# Patient Record
Sex: FEMALE | Race: WHITE | NOT HISPANIC OR LATINO | Employment: FULL TIME | URBAN - NONMETROPOLITAN AREA
[De-identification: names, ages, dates, MRNs, and addresses within clinical notes are randomized per-mention and may not be internally consistent; named-entity substitution may affect disease eponyms.]

---

## 2017-05-10 ENCOUNTER — OFFICE VISIT (OUTPATIENT)
Dept: SURGERY | Facility: CLINIC | Age: 67
End: 2017-05-10

## 2017-05-10 VITALS
DIASTOLIC BLOOD PRESSURE: 74 MMHG | HEART RATE: 72 BPM | SYSTOLIC BLOOD PRESSURE: 138 MMHG | RESPIRATION RATE: 16 BRPM | BODY MASS INDEX: 22.45 KG/M2 | WEIGHT: 122 LBS | HEIGHT: 62 IN

## 2017-05-10 DIAGNOSIS — L72.0 EPIDERMOID CYST OF SKIN: Primary | ICD-10-CM

## 2017-05-10 PROCEDURE — 11402 EXC TR-EXT B9+MARG 1.1-2 CM: CPT | Performed by: SURGERY

## 2017-05-10 RX ORDER — ATORVASTATIN CALCIUM 40 MG/1
TABLET, FILM COATED ORAL
COMMUNITY
Start: 2017-05-05 | End: 2019-05-15

## 2017-05-10 RX ORDER — SITAGLIPTIN AND METFORMIN HYDROCHLORIDE 1000; 50 MG/1; MG/1
TABLET, FILM COATED ORAL
COMMUNITY
Start: 2017-05-05 | End: 2019-05-15

## 2017-05-17 ENCOUNTER — OFFICE VISIT (OUTPATIENT)
Dept: SURGERY | Facility: CLINIC | Age: 67
End: 2017-05-17

## 2017-05-17 DIAGNOSIS — Z98.890 STATUS POST EXCISIONAL BIOPSY: Primary | ICD-10-CM

## 2017-05-17 PROCEDURE — 99024 POSTOP FOLLOW-UP VISIT: CPT | Performed by: SURGERY

## 2019-05-15 ENCOUNTER — OFFICE VISIT (OUTPATIENT)
Dept: SURGERY | Facility: CLINIC | Age: 69
End: 2019-05-15

## 2019-05-15 VITALS
SYSTOLIC BLOOD PRESSURE: 136 MMHG | HEART RATE: 72 BPM | WEIGHT: 118 LBS | RESPIRATION RATE: 16 BRPM | BODY MASS INDEX: 21.71 KG/M2 | DIASTOLIC BLOOD PRESSURE: 78 MMHG | HEIGHT: 62 IN

## 2019-05-15 DIAGNOSIS — L98.8 DYSPLASTIC SKIN: Primary | ICD-10-CM

## 2019-05-15 PROCEDURE — 11403 EXC TR-EXT B9+MARG 2.1-3CM: CPT | Performed by: SURGERY

## 2019-05-15 RX ORDER — FEXOFENADINE HCL 180 MG/1
180 TABLET ORAL DAILY
COMMUNITY
End: 2020-05-19

## 2019-05-15 NOTE — PROGRESS NOTES
Cristel Ojeda 69 y.o. female presents as a self ref for eval pink skin lesion LEFT chest.  Pt denies pain, itching, or burning.  Pt has applied husb barrier cream with bandaid to area x few days.   Chief Complaint   Patient presents with   • Skin Lesion     LEFT chest             HPI   Above noted and agree.  Cristel has an area of dryness that is irritated and has grown in size.  She would like it removed.      Review of Systems          Current Outpatient Medications:   •  fexofenadine (ALLEGRA) 180 MG tablet, Take 180 mg by mouth Daily., Disp: , Rfl:   •  fluticasone (VERAMYST) 27.5 MCG/SPRAY nasal spray, 2 sprays into the nostril(s) as directed by provider Daily., Disp: , Rfl:   •  metFORMIN (GLUCOPHAGE) 1000 MG tablet, Take 1,000 mg by mouth 2 (Two) Times a Day With Meals., Disp: , Rfl:         No Known Allergies        Past Medical History:   Diagnosis Date   • Diabetes (CMS/HCC)    • Hyperlipidemia            Past Surgical History:   Procedure Laterality Date   • KNEE SURGERY     • TUBAL ABDOMINAL LIGATION             Social History     Tobacco Use   • Smoking status: Former Smoker   Substance Use Topics   • Alcohol use: No   • Drug use: Not on file             There is no immunization history on file for this patient.        Physical Exam     I discussed with the patient the benefits and risks of excising this lesion.   Risks not limited to but including bleeding, infection, having to excise more if the lesion turns out to be cancer.  The patient appeared to understand and signed informed consent.  The area was prepped and draped in the usual sterile fashion.  Local was injected into the area to be excised.  The lesion was then excised.  Hemostasis was perfected.  The wound was then closed using simple sutures.  Sterile dressings were applied.  The patient tolerated the procedure well without complication.  Wound care instructions were then given to the patient.  The lesion measured 3 cm x 2 cm x 0.5 cm and was  "located on her chest    Debilities/Disabilities Identified: None    Emotional Behavior: Appropriate      /78   Pulse 72   Resp 16   Ht 157.5 cm (62\")   Wt 53.5 kg (118 lb)   BMI 21.58 kg/m²         Cristel was seen today for skin lesion.    Diagnoses and all orders for this visit:    Dysplastic skin    We will remove the sutures next week.    Thank you for allowing me to participate in the care of this interesting patient.        "

## 2019-05-23 ENCOUNTER — DOCUMENTATION (OUTPATIENT)
Dept: SURGERY | Facility: CLINIC | Age: 69
End: 2019-05-23

## 2019-05-23 NOTE — PROGRESS NOTES
Pt presented for suture removal chest.  Incision appears to be healed and wound edges well approximated.  4 sutures removed.  Pt instructed on care and enc to call PRN.

## 2023-10-12 ENCOUNTER — LAB (OUTPATIENT)
Dept: LAB | Facility: HOSPITAL | Age: 73
End: 2023-10-12
Payer: COMMERCIAL

## 2023-10-12 ENCOUNTER — OFFICE VISIT (OUTPATIENT)
Dept: FAMILY MEDICINE CLINIC | Facility: CLINIC | Age: 73
End: 2023-10-12
Payer: COMMERCIAL

## 2023-10-12 VITALS
HEIGHT: 62 IN | RESPIRATION RATE: 16 BRPM | WEIGHT: 98.4 LBS | DIASTOLIC BLOOD PRESSURE: 72 MMHG | HEART RATE: 89 BPM | OXYGEN SATURATION: 98 % | BODY MASS INDEX: 18.11 KG/M2 | SYSTOLIC BLOOD PRESSURE: 138 MMHG

## 2023-10-12 DIAGNOSIS — Z12.31 ENCOUNTER FOR SCREENING MAMMOGRAM FOR BREAST CANCER: ICD-10-CM

## 2023-10-12 DIAGNOSIS — E11.65 TYPE 2 DIABETES MELLITUS WITH HYPERGLYCEMIA, WITHOUT LONG-TERM CURRENT USE OF INSULIN: Primary | ICD-10-CM

## 2023-10-12 LAB
ALBUMIN SERPL-MCNC: 4.6 G/DL (ref 3.5–5.2)
ALBUMIN/GLOB SERPL: 1.5 G/DL
ALP SERPL-CCNC: 64 U/L (ref 39–117)
ALT SERPL W P-5'-P-CCNC: 18 U/L (ref 1–33)
ANION GAP SERPL CALCULATED.3IONS-SCNC: 12 MMOL/L (ref 5–15)
AST SERPL-CCNC: 41 U/L (ref 1–32)
BILIRUB SERPL-MCNC: 0.3 MG/DL (ref 0–1.2)
BUN SERPL-MCNC: 17 MG/DL (ref 8–23)
BUN/CREAT SERPL: 23.3 (ref 7–25)
CALCIUM SPEC-SCNC: 10.3 MG/DL (ref 8.6–10.5)
CHLORIDE SERPL-SCNC: 102 MMOL/L (ref 98–107)
CHOLEST SERPL-MCNC: 131 MG/DL (ref 0–200)
CO2 SERPL-SCNC: 28 MMOL/L (ref 22–29)
CREAT SERPL-MCNC: 0.73 MG/DL (ref 0.57–1)
EGFRCR SERPLBLD CKD-EPI 2021: 87 ML/MIN/1.73
GLOBULIN UR ELPH-MCNC: 3.1 GM/DL
GLUCOSE SERPL-MCNC: 101 MG/DL (ref 65–99)
HBA1C MFR BLD: 7.7 % (ref 4.8–5.6)
HDLC SERPL-MCNC: 48 MG/DL (ref 40–60)
LDLC SERPL CALC-MCNC: 63 MG/DL (ref 0–100)
LDLC/HDLC SERPL: 1.26 {RATIO}
POTASSIUM SERPL-SCNC: 3.4 MMOL/L (ref 3.5–5.2)
PROT SERPL-MCNC: 7.7 G/DL (ref 6–8.5)
SODIUM SERPL-SCNC: 142 MMOL/L (ref 136–145)
TRIGL SERPL-MCNC: 113 MG/DL (ref 0–150)
VLDLC SERPL-MCNC: 20 MG/DL (ref 5–40)

## 2023-10-12 PROCEDURE — 80061 LIPID PANEL: CPT | Performed by: STUDENT IN AN ORGANIZED HEALTH CARE EDUCATION/TRAINING PROGRAM

## 2023-10-12 PROCEDURE — 80053 COMPREHEN METABOLIC PANEL: CPT | Performed by: STUDENT IN AN ORGANIZED HEALTH CARE EDUCATION/TRAINING PROGRAM

## 2023-10-12 PROCEDURE — 36415 COLL VENOUS BLD VENIPUNCTURE: CPT | Performed by: STUDENT IN AN ORGANIZED HEALTH CARE EDUCATION/TRAINING PROGRAM

## 2023-10-12 PROCEDURE — 83036 HEMOGLOBIN GLYCOSYLATED A1C: CPT | Performed by: STUDENT IN AN ORGANIZED HEALTH CARE EDUCATION/TRAINING PROGRAM

## 2023-10-12 RX ORDER — CALCIPOTRIENE 50 UG/G
CREAM TOPICAL
COMMUNITY
Start: 2023-09-21

## 2023-10-12 RX ORDER — INSULIN GLARGINE 100 [IU]/ML
30 INJECTION, SOLUTION SUBCUTANEOUS DAILY
Status: SHIPPED | COMMUNITY
Start: 2023-10-12 | End: 2023-10-26 | Stop reason: SDUPTHER

## 2023-10-12 RX ORDER — METFORMIN HYDROCHLORIDE 500 MG/1
500 TABLET, EXTENDED RELEASE ORAL
Status: SHIPPED | COMMUNITY
Start: 2023-10-12

## 2023-10-12 RX ORDER — FLUOROURACIL 50 MG/G
CREAM TOPICAL
COMMUNITY
Start: 2023-09-22

## 2023-10-12 RX ORDER — ATORVASTATIN CALCIUM 80 MG/1
80 TABLET, FILM COATED ORAL NIGHTLY
Status: SHIPPED | COMMUNITY
Start: 2023-10-12

## 2023-10-12 NOTE — PROGRESS NOTES
Gustavo Guajardo,   [facility]  [address]  [place] [address]  [phone]    Subjective      Name Cristel Ojeda MRN [id number]    1950 AGE/SEX 73 y.o. / female      Chief Complaint Chief Complaint   Patient presents with    Establish Care     New patient here to establish care/. Concerned about pre cancer spots on hands, fatigue and elevated heart rate          Visit History for  10/12/2023    History of Present Illness  Cristel Ojeda is a 73 y.o. female who presented today for Establish Care (New patient here to establish care/. Concerned about pre cancer spots on hands, fatigue and elevated heart rate )    The patient is accompanied by her . Her last primary care doctor was Dr. Liliana Mclaughlin who prescribed her medications. She states that she was prescribed a significant amount of medications and is unsure if she should be on all of those medications or not. She has not been taking the majority of her medications because [facility] is not writing the necessary information on the labels and doesn't know what they are for so she doesn't take them.     The patient is on 4 different diabetes medications according to her medication list. She takes 30 units of insulin in the morning. She used to weigh 120 pounds and now only weighs 95 pounds over the last year. She states that she does not eat plenty of foods. She has not been taking her blood glucose levels. She notes that she does not know what her medications are for.    The patient last had her blood tests done probably 1 month ago (2023). The adult male reports that the patient has been confused most of the time, has no energy, is not willing to stand up, only eats 2 bites of foods, and has lost a significant amount of weight.  that the patient has been having pain all over her body. The adult male also mentions that the patient will only drink 2 sips of fluids and will refuse to drink  all of it. She notes that she likes strawberry cheesecake. She notes that she ate some peanut butter and carrot cake today, 10/12/2023. The adult male mentions that the patient's blood glucose has been elevated at night and has not been sleeping very well. She mentions that she still works and has been waking up around 4:00 AM.    Her last mammogram was probably 10 or 12 years ago.     The patient reports that she had her back looked at as she was having back pain. She was informed that she probably has arthritis after she had a magnetic resonance imaging done. She also relates her back pain to her current age of 73. The adult male notes that the patient started having back pain 6 months ag every night and has been noticed to be limping. She will have pain whenever she moves. The patient notes that her arthritis has not been resolving.    The patient reports that she is no longer able to open objects with her hands. She notes that she is more worried on the spots in her hands. She has precancerous cells. She notes that she has been scratching the spots and it made the spots more reddish in color.    The patient's blood pressure is mildly elevated today, 10/12/2023. She has not been taking her blood pressure medication, as she does not know which one it is. She took one of her medications, which caused her to be lightheaded all day.      Medications and Allergies   Current Outpatient Medications   Medication Instructions    atorvastatin (LIPITOR) 80 mg, Oral, Nightly, For High Cholesterol    calcipotriene (DOVONEX) 0.005 % cream     fluorouracil (EFUDEX) 5 % cream     glucose blood test strip 1 each, Other, 4 Times Daily Before Meals & Nightly, Use as instructed    glucose monitor monitoring kit 1 each, Does not apply, 4 Times Daily Before Meals & Nightly    Lancets (accu-chek safe-t pro) lancets 1 each, Other, 4 Times Daily Before Meals & Nightly    Lantus SoloStar 32 Units, Subcutaneous, Daily    metFORMIN ER  "(GLUCOPHAGE-XR) 500 mg, Daily With Breakfast, For diabetes     No Known Allergies   I have reviewed the above medications and allergies     Objective:      Vitals Vitals:    10/12/23 1135   BP: 138/72   BP Location: Right arm   Patient Position: Sitting   Cuff Size: Adult   Pulse: 89   Resp: 16   SpO2: 98%   Weight: 44.6 kg (98 lb 6.4 oz)   Height: 157.5 cm (62\")     Body mass index is 18 kg/m².    Physical Exam  Vitals reviewed.   Constitutional:       General: She is not in acute distress.     Appearance: She is not ill-appearing.   Cardiovascular:      Rate and Rhythm: Normal rate and regular rhythm.   Pulmonary:      Effort: Pulmonary effort is normal.      Breath sounds: Normal breath sounds.   Neurological:      Mental Status: She is alert.   Psychiatric:         Mood and Affect: Mood normal.         Behavior: Behavior normal.         Thought Content: Thought content normal.         Judgment: Judgment normal.            Assessment/Plan      Issues Addressed/ Plan  1. Type 2 diabetes mellitus with hyperglycemia, without long-term current use of insulin  - Question patient compliance with medication.  Going to need labs completed today to make sure that medication is sufficient.    - Possible disordered eating.  Has lost wt and patient  stating that she does not eat much.  Need to continue to monitor BG  - Comprehensive Metabolic Panel  - Hemoglobin A1c  - Lipid Panel  - MicroAlbumin, Urine, Random - Urine, Clean Catch    2. Encounter for screening mammogram for breast cancer  - Mammo Screening Digital Tomosynthesis Bilateral With CAD     There are no Patient Instructions on file for this visit.        Follow up  recommended Return in about 2 weeks (around 10/26/2023).   - Dragon voice recognition software was utilized to complete this chart.  Every reasonable attempt was made to edit and correct the text, however some incorrect words may remain.   Gustavo Guajardo,     Transcribed from ambient dictation " for Gustavo Guajardo, DO by Desiree Bell.  10/12/23   16:32 EDT    Patient or patient representative verbalized consent to the visit recording.  I have personally performed the services described in this document as transcribed by the above individual, and it is both accurate and complete.

## 2023-10-26 ENCOUNTER — PATIENT ROUNDING (BHMG ONLY) (OUTPATIENT)
Dept: FAMILY MEDICINE CLINIC | Facility: CLINIC | Age: 73
End: 2023-10-26
Payer: COMMERCIAL

## 2023-10-26 ENCOUNTER — OFFICE VISIT (OUTPATIENT)
Dept: FAMILY MEDICINE CLINIC | Facility: CLINIC | Age: 73
End: 2023-10-26
Payer: COMMERCIAL

## 2023-10-26 VITALS
WEIGHT: 97.02 LBS | DIASTOLIC BLOOD PRESSURE: 82 MMHG | RESPIRATION RATE: 16 BRPM | BODY MASS INDEX: 17.85 KG/M2 | HEIGHT: 62 IN | OXYGEN SATURATION: 99 % | SYSTOLIC BLOOD PRESSURE: 152 MMHG | HEART RATE: 62 BPM

## 2023-10-26 DIAGNOSIS — R21 RASH: ICD-10-CM

## 2023-10-26 DIAGNOSIS — E11.65 TYPE 2 DIABETES MELLITUS WITH HYPERGLYCEMIA, WITHOUT LONG-TERM CURRENT USE OF INSULIN: Primary | ICD-10-CM

## 2023-10-26 PROCEDURE — 99214 OFFICE O/P EST MOD 30 MIN: CPT | Performed by: STUDENT IN AN ORGANIZED HEALTH CARE EDUCATION/TRAINING PROGRAM

## 2023-10-26 RX ORDER — INSULIN GLARGINE 100 [IU]/ML
32 INJECTION, SOLUTION SUBCUTANEOUS DAILY
Qty: 15 ML | Refills: 0 | Status: SHIPPED | OUTPATIENT
Start: 2023-10-26 | End: 2023-11-09

## 2023-10-26 RX ORDER — BLOOD-GLUCOSE METER
1 KIT MISCELLANEOUS
Qty: 1 EACH | Refills: 0 | Status: SHIPPED | OUTPATIENT
Start: 2023-10-26

## 2023-10-26 RX ORDER — LANCETS 23 GAUGE
1 EACH MISCELLANEOUS
Qty: 200 EACH | Refills: 12 | Status: SHIPPED | OUTPATIENT
Start: 2023-10-26

## 2023-10-26 NOTE — PROGRESS NOTES
A [employer] message has been sent to the patient for PATIENT ROUNDING with [facility]

## 2023-10-26 NOTE — PROGRESS NOTES
Gustavo Guajardo DO  [facility]  [facility]  [place] [address] [phone]    Subjective      Name Cristel Ojeda MRN [id number]    1950 AGE/SEX 73 y.o. / female      Chief Complaint Chief Complaint   Patient presents with    Diabetes     Follow up     Rash     Rash on hand still causing problems          Visit History for  10/26/2023    History of Present Illness  Cristel Ojeda is a 73 y.o. female who presented today for Diabetes (Follow up ) and Rash (Rash on hand still causing problems )  .   The patient is accompanied by an adult male.    The patient is complaining of a precancerous rash with pruritus on her left hand. She has been applying fluorouracil on the hand.5 days on and 7 days off. She called the dermatologist, and her next appointment is 2023. The patient picks at the rash causing it to be erythema and inflammation. The cream is causing her skin to peel and become rough. The dermatologist advised her not to wear gloves.    The patient has not been checking her blood glucose at home because her machine stopped working 2 or 3 weeks ago. She is not eating much because food does not taste like it used to. She is taking Lantus. The patient is severely underweight.     Occasionally, the patient will smoke a cigarette. The patient works as a .        Medications and Allergies   Current Outpatient Medications   Medication Instructions    atorvastatin (LIPITOR) 80 mg, Oral, Nightly, For High Cholesterol    calcipotriene (DOVONEX) 0.005 % cream     celecoxib (CELEBREX) 100 mg, Oral, Daily    glucose blood test strip 1 each, Other, 4 Times Daily Before Meals & Nightly, Use as instructed    glucose monitor monitoring kit 1 each, Does not apply, 4 Times Daily Before Meals & Nightly    Lancets (accu-chek safe-t pro) lancets 1 each, Other, 4 Times Daily Before Meals & Nightly    Lantus SoloStar 34 Units, Subcutaneous, Daily    metFORMIN ER  "(GLUCOPHAGE-XR) 500 mg, Daily With Breakfast, For diabetes     No Known Allergies   I have reviewed the above medications and allergies     Objective:      Vitals Vitals:    10/26/23 1140   BP: 152/82   BP Location: Right arm   Patient Position: Sitting   Cuff Size: Adult   Pulse: 62   Resp: 16   SpO2: 99%   Weight: 44 kg (97 lb 0.3 oz)   Height: 157.5 cm (62\")     Body mass index is 17.75 kg/m².    Physical Exam  Vitals reviewed.   Constitutional:       General: She is not in acute distress.     Appearance: She is not ill-appearing.   Pulmonary:      Effort: Pulmonary effort is normal.   Psychiatric:         Mood and Affect: Mood normal.         Behavior: Behavior normal.         Thought Content: Thought content normal.         Judgment: Judgment normal.            Assessment/Plan      Issues Addressed/ Plan  1. Type 2 diabetes mellitus with hyperglycemia, without long-term current use of insulin  -Going to increase Lantus 32 units.  - Going to order glucose testing machine so that patient can check at least morning fasting labs.  Diabetes is most likely poorly controlled as well as patient diet is poor.  - May need to refer to dietitian in the future as patient is currently underweight.  - glucose monitor monitoring kit; Use 1 each 4 (Four) Times a Day Before Meals & at Bedtime.  Dispense: 1 each; Refill: 0  - Lantus SoloStar 100 UNIT/ML injection pen; Inject 32 Units under the skin into the appropriate area as directed Daily for 30 days. Indications: Type 2 Diabetes  Dispense: 15 mL; Refill: 0  - glucose blood test strip; 1 each by Other route 4 (Four) Times a Day Before Meals & at Bedtime. Use as instructed  Dispense: 200 each; Refill: 1  - Lancets (accu-chek safe-t pro) lancets; 1 each by Other route 4 (Four) Times a Day Before Meals & at Bedtime.  Dispense: 200 each; Refill: 12    Rash   Left hand rash.  - Most likely secondary to use of fluorouracil.  The patient is obsessive about her hand and is generally " not worried about other medical conditions at this time.  Going to have patient's stop the cream and consult with her dermatologist.  There are open wounds between her fingers as well.  - Need to keep hands and wounds clean and use Vaseline or moisturizing ointment       There are no Patient Instructions on file for this visit.  BMI is below normal parameters (malnutrition). Recommendations: treating the underlying disease process and Information on healthy weight added to patient's after visit summary         Follow up  recommended Return in about 2 weeks (around 11/9/2023).   - Dragon voice recognition software was utilized to complete this chart.  Every reasonable attempt was made to edit and correct the text, however some incorrect words may remain.   Gustavo Guajardo DO       Transcribed from ambient dictation for Gustavo Guajardo DO by Peggy Man.  10/26/23   19:31 EDT    Patient or patient representative verbalized consent to the visit recording.  I have personally performed the services described in this document as transcribed by the above individual, and it is both accurate and complete.

## 2023-11-09 ENCOUNTER — OFFICE VISIT (OUTPATIENT)
Dept: FAMILY MEDICINE CLINIC | Facility: CLINIC | Age: 73
End: 2023-11-09
Payer: COMMERCIAL

## 2023-11-09 VITALS
SYSTOLIC BLOOD PRESSURE: 142 MMHG | RESPIRATION RATE: 20 BRPM | WEIGHT: 99 LBS | HEART RATE: 91 BPM | OXYGEN SATURATION: 99 % | DIASTOLIC BLOOD PRESSURE: 94 MMHG | HEIGHT: 62 IN | BODY MASS INDEX: 18.22 KG/M2

## 2023-11-09 DIAGNOSIS — Z13.820 SCREENING FOR OSTEOPOROSIS: ICD-10-CM

## 2023-11-09 DIAGNOSIS — G89.29 CHRONIC BILATERAL LOW BACK PAIN WITHOUT SCIATICA: ICD-10-CM

## 2023-11-09 DIAGNOSIS — Z12.11 SCREENING FOR MALIGNANT NEOPLASM OF COLON: Primary | ICD-10-CM

## 2023-11-09 DIAGNOSIS — M19.90 ARTHRITIS: ICD-10-CM

## 2023-11-09 DIAGNOSIS — E11.65 TYPE 2 DIABETES MELLITUS WITH HYPERGLYCEMIA, WITHOUT LONG-TERM CURRENT USE OF INSULIN: ICD-10-CM

## 2023-11-09 DIAGNOSIS — M54.50 CHRONIC BILATERAL LOW BACK PAIN WITHOUT SCIATICA: ICD-10-CM

## 2023-11-09 RX ORDER — INSULIN GLARGINE 100 [IU]/ML
34 INJECTION, SOLUTION SUBCUTANEOUS DAILY
Qty: 15 ML | Refills: 0 | Status: SHIPPED | OUTPATIENT
Start: 2023-11-09 | End: 2023-12-09

## 2023-11-09 RX ORDER — CELECOXIB 100 MG/1
100 CAPSULE ORAL DAILY
Qty: 30 CAPSULE | Refills: 2 | Status: SHIPPED | OUTPATIENT
Start: 2023-11-09

## 2023-11-09 NOTE — PROGRESS NOTES
Gustavo Guajardo,   [facility]  [address]  [place] [id number] [phone]    Subjective      Name Cristel Ojeda MRN [id number]    1950 AGE/SEX 73 y.o. / female      Chief Complaint Chief Complaint   Patient presents with    Diabetes     Follow up, has not been checking blood sugar as directed     Back Pain     Having a lot of lower back pain          Visit History for  2023    History of Present Illness  Cristel Ojeda is a 73 y.o. female who presented today for Diabetes (Follow up, has not been checking blood sugar as directed ) and Back Pain (Having a lot of lower back pain )    Had mild weight gain of 2 pounds. Blood glucose was 122 mg/dL this morning, 2023, before eating. Only ate a bowl of chili and some potato chips yesterday, 2023. Blood glucose was 237 mg/dL yesterday, 2023. Only consumes Ensure with a cup of coffee and eats no breakfast. Only drinks Diet Coke. Hemoglobin A1c was 7.7 percent. Doing 32 units of insulin. Has appetite today, 2023, due to not eating much food. Not a big eater and never had been. Weight dropped significantly starting in Fall last year, 2023 or 10/2023.    Spots from the hands have diminished. Has not used the cream for over 1 week now. Notes that hands are still red.    Declines to have a colonoscopy. Has an appointment for a mammogram next week, the week of 2023. Last colonoscopy was probably in  or . Agrees to do a bone density screening.    Has been having pain in the back. Magnetic resonance imaging from 4 or 5 months ago showed arthritis in the spine. Takes Tylenol every now and then for the back pain, which helps occasionally.    Sister had colon cancer. Still works all the time, starting from 5:30 AM.      Medications and Allergies   Current Outpatient Medications   Medication Instructions    atorvastatin (LIPITOR) 80 mg, Oral, Nightly, For High Cholesterol    calcipotriene  "(DOVONEX) 0.005 % cream     celecoxib (CELEBREX) 100 mg, Oral, Daily    glucose blood test strip 1 each, Other, 4 Times Daily Before Meals & Nightly, Use as instructed    glucose monitor monitoring kit 1 each, Does not apply, 4 Times Daily Before Meals & Nightly    Lancets (accu-chek safe-t pro) lancets 1 each, Other, 4 Times Daily Before Meals & Nightly    Lantus SoloStar 34 Units, Subcutaneous, Daily    metFORMIN ER (GLUCOPHAGE-XR) 500 mg, Daily With Breakfast, For diabetes     No Known Allergies   I have reviewed the above medications and allergies     Objective:      Vitals Vitals:    11/09/23 1353   BP: 142/94   BP Location: Right arm   Patient Position: Sitting   Cuff Size: Adult   Pulse: 91   Resp: 20   SpO2: 99%   Weight: 44.9 kg (99 lb)   Height: 157.5 cm (62\")     Body mass index is 18.11 kg/m².    Physical Exam  Vitals reviewed.   Constitutional:       General: She is not in acute distress.     Appearance: She is not ill-appearing.   Pulmonary:      Effort: Pulmonary effort is normal.   Psychiatric:         Mood and Affect: Mood normal.         Behavior: Behavior normal.         Thought Content: Thought content normal.         Judgment: Judgment normal.            Assessment/Plan      Issues Addressed/ Plan  1. Type 2 diabetes mellitus with hyperglycemia, without long-term current use of insulin  -Going to increase Lantus from 32 units to 34 units due to elevated fasting glucose in the mornings.  - Continue to keep track of blood glucose levels in the morning and make sure that she is not going below 80 mg/dL  - Continue drinking Ensure in order to gain weight and increase protein.  - Ideally patient should weigh around 110 pounds  - Continue to focus on macros with protein and fats.  - Lantus SoloStar 100 UNIT/ML injection pen; Inject 34 Units under the skin into the appropriate area as directed Daily for 30 days. Indications: Type 2 Diabetes  Dispense: 15 mL; Refill: 0    2. Screening for malignant " neoplasm of colon  -Patient declines colonoscopy at this time.  - Cologuard - Stool, Per Rectum; Future    3. Screening for osteoporosis  - DEXA Bone Density Axial; Future    4. Chronic bilateral low back pain without sciatica  -Discussed natural progression of low back pain.  Going to try to utilize Celebrex in order to help.  Discussed no longer using NSAIDs along with Celebrex.  - celecoxib (CeleBREX) 100 MG capsule; Take 1 capsule by mouth Daily.  Dispense: 30 capsule; Refill: 2    5. Arthritis  - celecoxib (CeleBREX) 100 MG capsule; Take 1 capsule by mouth Daily.  Dispense: 30 capsule; Refill: 2     There are no Patient Instructions on file for this visit.         Follow up  recommended Return in about 3 months (around 2/9/2024).   - Dragon voice recognition software was utilized to complete this chart.  Every reasonable attempt was made to edit and correct the text, however some incorrect words may remain.       Transcribed from ambient dictation for Gustavo Guajardo DO by Desiree Bell.  11/09/23   17:01 EST    Patient or patient representative verbalized consent to the visit recording.  I have personally performed the services described in this document as transcribed by the above individual, and it is both accurate and complete.

## 2023-11-10 ENCOUNTER — PRIOR AUTHORIZATION (OUTPATIENT)
Dept: FAMILY MEDICINE CLINIC | Facility: CLINIC | Age: 73
End: 2023-11-10
Payer: COMMERCIAL

## 2023-11-10 NOTE — TELEPHONE ENCOUNTER
PA Celecoxib 100MG capsules  Approvedtoday  PA Case: [id number], Status: Approved, Coverage Starts on: 11/10/2023 12:00:00 AM, Coverage Ends on: 11/9/2024 12:00:00 AM.

## 2023-11-15 ENCOUNTER — HOSPITAL ENCOUNTER (OUTPATIENT)
Dept: MAMMOGRAPHY | Facility: HOSPITAL | Age: 73
Discharge: HOME | End: 2023-11-15
Admitting: STUDENT IN AN ORGANIZED HEALTH CARE EDUCATION/TRAINING PROGRAM
Payer: COMMERCIAL

## 2023-11-15 PROCEDURE — 77063 BREAST TOMOSYNTHESIS BI: CPT

## 2023-11-15 PROCEDURE — 77067 SCR MAMMO BI INCL CAD: CPT

## 2023-11-16 ENCOUNTER — TELEPHONE (OUTPATIENT)
Dept: FAMILY MEDICINE CLINIC | Facility: CLINIC | Age: 73
End: 2023-11-16

## 2023-11-16 NOTE — TELEPHONE ENCOUNTER
Caller: Cristel Ojeda    Relationship: Self    Best call back number:[phone]    What is the best time to reach you: ANYTIME    Who are you requesting to speak with (clinical staff, provider,  specific staff member): CLINICAL    What was the call regarding: PATIENT WAS REQUESTING A CALLBACK TO GIVE MAMMOGRAM RESULTS.

## 2023-12-12 ENCOUNTER — TELEPHONE (OUTPATIENT)
Dept: FAMILY MEDICINE CLINIC | Facility: CLINIC | Age: 73
End: 2023-12-12
Payer: COMMERCIAL

## 2023-12-12 NOTE — TELEPHONE ENCOUNTER
Caller: Cristel Ojeda    Relationship: Self    Best call back number:     Caller requesting test results:     What test was performed: JED    When was the test performed: 2 WEEKS AGO    Where was the test performed:     Additional notes: PATIENT IS CALLING IN TO SEE IF THE DR HAS HER COLOGUARD TEST RESULTS IN YET.  SHE WANTS TO BE CALLED TO DISCUSS.

## 2023-12-27 ENCOUNTER — TELEPHONE (OUTPATIENT)
Dept: FAMILY MEDICINE CLINIC | Facility: CLINIC | Age: 73
End: 2023-12-27
Payer: COMMERCIAL

## 2023-12-27 ENCOUNTER — LAB (OUTPATIENT)
Dept: LAB | Facility: HOSPITAL | Age: 73
End: 2023-12-27
Payer: COMMERCIAL

## 2023-12-27 DIAGNOSIS — E11.65 TYPE 2 DIABETES MELLITUS WITH HYPERGLYCEMIA, WITHOUT LONG-TERM CURRENT USE OF INSULIN: Primary | ICD-10-CM

## 2023-12-27 LAB — ALBUMIN UR-MCNC: 1.3 MG/DL

## 2023-12-27 PROCEDURE — 82043 UR ALBUMIN QUANTITATIVE: CPT | Performed by: STUDENT IN AN ORGANIZED HEALTH CARE EDUCATION/TRAINING PROGRAM

## 2024-01-10 ENCOUNTER — OFFICE VISIT (OUTPATIENT)
Dept: FAMILY MEDICINE CLINIC | Facility: CLINIC | Age: 74
End: 2024-01-10
Payer: COMMERCIAL

## 2024-01-10 VITALS
DIASTOLIC BLOOD PRESSURE: 68 MMHG | HEIGHT: 62 IN | RESPIRATION RATE: 18 BRPM | SYSTOLIC BLOOD PRESSURE: 142 MMHG | HEART RATE: 47 BPM | WEIGHT: 100 LBS | OXYGEN SATURATION: 99 % | BODY MASS INDEX: 18.4 KG/M2

## 2024-01-10 DIAGNOSIS — M54.50 CHRONIC BILATERAL LOW BACK PAIN WITHOUT SCIATICA: ICD-10-CM

## 2024-01-10 DIAGNOSIS — G89.29 CHRONIC BILATERAL LOW BACK PAIN WITHOUT SCIATICA: ICD-10-CM

## 2024-01-10 DIAGNOSIS — E78.2 MIXED HYPERLIPIDEMIA: ICD-10-CM

## 2024-01-10 DIAGNOSIS — E55.9 VITAMIN D DEFICIENCY: ICD-10-CM

## 2024-01-10 DIAGNOSIS — R53.83 FATIGUE, UNSPECIFIED TYPE: Primary | ICD-10-CM

## 2024-01-10 DIAGNOSIS — E11.65 TYPE 2 DIABETES MELLITUS WITH HYPERGLYCEMIA, WITHOUT LONG-TERM CURRENT USE OF INSULIN: ICD-10-CM

## 2024-01-10 PROCEDURE — 99214 OFFICE O/P EST MOD 30 MIN: CPT | Performed by: STUDENT IN AN ORGANIZED HEALTH CARE EDUCATION/TRAINING PROGRAM

## 2024-01-10 RX ORDER — CYCLOBENZAPRINE HCL 10 MG
10 TABLET ORAL 3 TIMES DAILY PRN
Qty: 21 TABLET | Refills: 0 | Status: SHIPPED | OUTPATIENT
Start: 2024-01-10

## 2024-01-10 NOTE — PROGRESS NOTES
Venipuncture Blood Specimen Collection  Venipuncture performed in left niesha by Kami Staples MA with good hemostasis. Patient tolerated the procedure well without complications.   01/10/24   Kami Staples MA

## 2024-01-10 NOTE — PROGRESS NOTES
Gustavo Guajardo,   [facility] PRIMARY CARE  [address]  [place] [id number] [phone]    Subjective      Name Cristel Oejda MRN [id number]    1950 AGE/SEX 73 y.o. / female      Chief Complaint Chief Complaint   Patient presents with    Diabetes     Follow up     Leg Pain     Right leg pain, patient states she sits in a chair at work and moving back and forth for her job has caused pain in her leg and lower back          Visit History for  01/10/2024    History of Present Illness  Cristel Ojeda is a 73 y.o. female who presented today for Diabetes (Follow up ) and Leg Pain (Right leg pain, patient states she sits in a chair at work and moving back and forth for her job has caused pain in her leg and lower back )  She is accompanied by an adult male.    Complaints of back pain for the last couple of weeks. Made an appointment with urgent care 2 or 3 weeks ago and received an injection, which was effective in relieving the pain. Currently is not taking any prescribed or over the counter medications. Most days are spent in an office chair. Today, minor leg pain is present as well.    Blood glucose level was 158 mg/dL this morning. Wakes up at 4 AM and makes breakfast and is at work by 5 AM. Drinks one diet Coke a day and is taking 34 units of insulin.    Did not do bone density scan and no longer wants to have the scan done. Hands are feeling better than last visit.    Her sister has colon cancer.        Medications and Allergies   Current Outpatient Medications   Medication Instructions    atorvastatin (LIPITOR) 80 mg, Oral, Nightly, For High Cholesterol    celecoxib (CELEBREX) 100 mg, Oral, Daily    cyclobenzaprine (FLEXERIL) 10 mg, Oral, 3 Times Daily PRN    glucose blood test strip 1 each, Other, 4 Times Daily Before Meals & Nightly, Use as instructed    glucose monitor monitoring kit 1 each, Does not apply, 4 Times Daily Before Meals & Nightly    Lancets (accu-chek safe-t pro)  "lancets 1 each, Other, 4 Times Daily Before Meals & Nightly    Lantus SoloStar 34 Units, Subcutaneous, Daily    metFORMIN ER (GLUCOPHAGE-XR) 500 mg, Daily With Breakfast, For diabetes     No Known Allergies   I have reviewed the above medications and allergies     Objective:      Vitals Vitals:    01/10/24 0840   BP: 142/68   BP Location: Right arm   Patient Position: Sitting   Cuff Size: Adult   Pulse: (!) 47   Resp: 18   SpO2: 99%   Weight: 45.4 kg (100 lb)   Height: 157.5 cm (62\")     Body mass index is 18.29 kg/m².    Physical Exam  Vitals reviewed.   Constitutional:       General: She is not in acute distress.     Appearance: She is not ill-appearing.   Cardiovascular:      Rate and Rhythm: Normal rate and regular rhythm.   Pulmonary:      Effort: Pulmonary effort is normal.      Breath sounds: Normal breath sounds.   Neurological:      Mental Status: She is alert.   Psychiatric:         Mood and Affect: Mood normal.         Behavior: Behavior normal.         Thought Content: Thought content normal.         Judgment: Judgment normal.            Assessment/Plan      Issues Addressed/ Plan   Diagnosis Plan   1. Fatigue, unspecified type  CBC w AUTO Differential    Vitamin D 25 hydroxy      2. Type 2 diabetes mellitus with hyperglycemia, without long-term current use of insulin  Comprehensive metabolic panel    Hemoglobin A1c    Lipid panel    CBC w AUTO Differential      3. Chronic bilateral low back pain without sciatica  cyclobenzaprine (FLEXERIL) 10 MG tablet      4. Mixed hyperlipidemia  Lipid panel          Diabetes mellitus.  BMI is already below normal, concerns about the downsides to artificial sweeteners. Order placed for labs to be drawn today.  Needs to continue with diet supplementation if possible and continue to adjust medication as needed.     Fatigue  Will check labs today.  Unclear etiology at this time.  Patient needs to continue to work on diet and glucose management as well.    Health " maintenance.  Recommended to repeat her mammogram in 1 year. Declines a bone density scan at this time.        Follow up  recommended Return in about 3 months (around 4/10/2024) for Diabetes.   - Dragon voice recognition software was utilized to complete this chart.  Every reasonable attempt was made to edit and correct the text, however some incorrect words may remain.   Gustavo Guajardo DO      Transcribed from ambient dictation for Gustavo Guajardo DO by Jelly Jaramillo.  01/10/24   13:03 EST    Patient or patient representative verbalized consent to the visit recording.  I have personally performed the services described in this document as transcribed by the above individual, and it is both accurate and complete.

## 2024-01-11 LAB
25(OH)D3+25(OH)D2 SERPL-MCNC: 26.2 NG/ML (ref 30–100)
ALBUMIN SERPL-MCNC: 4.4 G/DL (ref 3.5–5.2)
ALBUMIN/GLOB SERPL: 1.6 G/DL
ALP SERPL-CCNC: 79 U/L (ref 39–117)
ALT SERPL-CCNC: 11 U/L (ref 1–33)
AST SERPL-CCNC: 33 U/L (ref 1–32)
BASOPHILS # BLD AUTO: 0.07 10*3/MM3 (ref 0–0.2)
BASOPHILS NFR BLD AUTO: 0.6 % (ref 0–1.5)
BILIRUB SERPL-MCNC: 0.4 MG/DL (ref 0–1.2)
BUN SERPL-MCNC: 9 MG/DL (ref 8–23)
BUN/CREAT SERPL: 10.7 (ref 7–25)
CALCIUM SERPL-MCNC: 9.9 MG/DL (ref 8.6–10.5)
CHLORIDE SERPL-SCNC: 99 MMOL/L (ref 98–107)
CHOLEST SERPL-MCNC: 142 MG/DL (ref 0–200)
CO2 SERPL-SCNC: 26.8 MMOL/L (ref 22–29)
CREAT SERPL-MCNC: 0.84 MG/DL (ref 0.57–1)
EGFRCR SERPLBLD CKD-EPI 2021: 73.5 ML/MIN/1.73
EOSINOPHIL # BLD AUTO: 0.07 10*3/MM3 (ref 0–0.4)
EOSINOPHIL NFR BLD AUTO: 0.6 % (ref 0.3–6.2)
ERYTHROCYTE [DISTWIDTH] IN BLOOD BY AUTOMATED COUNT: 11.5 % (ref 12.3–15.4)
GLOBULIN SER CALC-MCNC: 2.7 GM/DL
GLUCOSE SERPL-MCNC: 201 MG/DL (ref 65–99)
HBA1C MFR BLD: 8.1 % (ref 4.8–5.6)
HCT VFR BLD AUTO: 38.6 % (ref 34–46.6)
HDLC SERPL-MCNC: 47 MG/DL (ref 40–60)
HGB BLD-MCNC: 12.6 G/DL (ref 12–15.9)
IMM GRANULOCYTES # BLD AUTO: 0.03 10*3/MM3 (ref 0–0.05)
IMM GRANULOCYTES NFR BLD AUTO: 0.3 % (ref 0–0.5)
LDLC SERPL CALC-MCNC: 71 MG/DL (ref 0–100)
LYMPHOCYTES # BLD AUTO: 2.63 10*3/MM3 (ref 0.7–3.1)
LYMPHOCYTES NFR BLD AUTO: 24.2 % (ref 19.6–45.3)
MCH RBC QN AUTO: 28 PG (ref 26.6–33)
MCHC RBC AUTO-ENTMCNC: 32.6 G/DL (ref 31.5–35.7)
MCV RBC AUTO: 85.8 FL (ref 79–97)
MONOCYTES # BLD AUTO: 1.1 10*3/MM3 (ref 0.1–0.9)
MONOCYTES NFR BLD AUTO: 10.1 % (ref 5–12)
NEUTROPHILS # BLD AUTO: 6.95 10*3/MM3 (ref 1.7–7)
NEUTROPHILS NFR BLD AUTO: 64.2 % (ref 42.7–76)
NRBC BLD AUTO-RTO: 0 /100 WBC (ref 0–0.2)
PLATELET # BLD AUTO: 304 10*3/MM3 (ref 140–450)
POTASSIUM SERPL-SCNC: 4.4 MMOL/L (ref 3.5–5.2)
PROT SERPL-MCNC: 7.1 G/DL (ref 6–8.5)
RBC # BLD AUTO: 4.5 10*6/MM3 (ref 3.77–5.28)
SODIUM SERPL-SCNC: 137 MMOL/L (ref 136–145)
TRIGL SERPL-MCNC: 137 MG/DL (ref 0–150)
VLDLC SERPL CALC-MCNC: 24 MG/DL (ref 5–40)
WBC # BLD AUTO: 10.85 10*3/MM3 (ref 3.4–10.8)

## 2024-01-22 RX ORDER — ERGOCALCIFEROL 1.25 MG/1
50000 CAPSULE ORAL WEEKLY
Qty: 5 CAPSULE | Refills: 2 | Status: SHIPPED | OUTPATIENT
Start: 2024-01-22

## 2024-02-02 DIAGNOSIS — G89.29 CHRONIC BILATERAL LOW BACK PAIN WITHOUT SCIATICA: ICD-10-CM

## 2024-02-02 DIAGNOSIS — M19.90 ARTHRITIS: ICD-10-CM

## 2024-02-02 DIAGNOSIS — M54.50 CHRONIC BILATERAL LOW BACK PAIN WITHOUT SCIATICA: ICD-10-CM

## 2024-02-02 RX ORDER — CELECOXIB 100 MG/1
100 CAPSULE ORAL DAILY
Qty: 30 CAPSULE | Refills: 2 | Status: SHIPPED | OUTPATIENT
Start: 2024-02-02

## 2024-04-12 DIAGNOSIS — E55.9 VITAMIN D DEFICIENCY: ICD-10-CM

## 2024-04-12 RX ORDER — ERGOCALCIFEROL 1.25 MG/1
50000 CAPSULE ORAL WEEKLY
Qty: 4 CAPSULE | Refills: 1 | Status: SHIPPED | OUTPATIENT
Start: 2024-04-12

## 2024-04-12 NOTE — TELEPHONE ENCOUNTER
Rx Refill Note  Requested Prescriptions     Pending Prescriptions Disp Refills    vitamin D (ERGOCALCIFEROL) 1.25 MG (23479 UT) capsule capsule [Pharmacy Med Name: VITAMIN D2 1.25MG(50,000 UNIT)] 4 capsule      Sig: TAKE 1 CAPSULE BY MOUTH ONCE WEEKLY      Last office visit with prescribing clinician: 1/10/2024   Last telemedicine visit with prescribing clinician: Visit date not found   Next office visit with prescribing clinician: 5/3/2024

## 2024-06-04 DIAGNOSIS — M19.90 ARTHRITIS: ICD-10-CM

## 2024-06-04 DIAGNOSIS — M54.50 CHRONIC BILATERAL LOW BACK PAIN WITHOUT SCIATICA: ICD-10-CM

## 2024-06-04 DIAGNOSIS — G89.29 CHRONIC BILATERAL LOW BACK PAIN WITHOUT SCIATICA: ICD-10-CM

## 2024-06-04 RX ORDER — CELECOXIB 100 MG/1
100 CAPSULE ORAL DAILY
Qty: 30 CAPSULE | Refills: 2 | Status: SHIPPED | OUTPATIENT
Start: 2024-06-04

## 2024-06-05 ENCOUNTER — OFFICE VISIT (OUTPATIENT)
Dept: FAMILY MEDICINE CLINIC | Facility: CLINIC | Age: 74
End: 2024-06-05
Payer: MEDICARE

## 2024-06-05 VITALS
HEART RATE: 78 BPM | HEIGHT: 62 IN | SYSTOLIC BLOOD PRESSURE: 116 MMHG | BODY MASS INDEX: 18.48 KG/M2 | WEIGHT: 100.4 LBS | OXYGEN SATURATION: 97 % | DIASTOLIC BLOOD PRESSURE: 64 MMHG | RESPIRATION RATE: 18 BRPM

## 2024-06-05 DIAGNOSIS — E78.2 MIXED HYPERLIPIDEMIA: ICD-10-CM

## 2024-06-05 DIAGNOSIS — E11.65 TYPE 2 DIABETES MELLITUS WITH HYPERGLYCEMIA, WITHOUT LONG-TERM CURRENT USE OF INSULIN: ICD-10-CM

## 2024-06-05 DIAGNOSIS — E55.9 VITAMIN D DEFICIENCY: Primary | ICD-10-CM

## 2024-06-05 PROCEDURE — 99214 OFFICE O/P EST MOD 30 MIN: CPT | Performed by: STUDENT IN AN ORGANIZED HEALTH CARE EDUCATION/TRAINING PROGRAM

## 2024-06-05 PROCEDURE — 3051F HG A1C>EQUAL 7.0%<8.0%: CPT | Performed by: STUDENT IN AN ORGANIZED HEALTH CARE EDUCATION/TRAINING PROGRAM

## 2024-06-05 PROCEDURE — 36415 COLL VENOUS BLD VENIPUNCTURE: CPT | Performed by: STUDENT IN AN ORGANIZED HEALTH CARE EDUCATION/TRAINING PROGRAM

## 2024-06-05 PROCEDURE — 1160F RVW MEDS BY RX/DR IN RCRD: CPT | Performed by: STUDENT IN AN ORGANIZED HEALTH CARE EDUCATION/TRAINING PROGRAM

## 2024-06-05 PROCEDURE — 1159F MED LIST DOCD IN RCRD: CPT | Performed by: STUDENT IN AN ORGANIZED HEALTH CARE EDUCATION/TRAINING PROGRAM

## 2024-06-05 RX ORDER — ATORVASTATIN CALCIUM 80 MG/1
80 TABLET, FILM COATED ORAL NIGHTLY
Qty: 90 TABLET | Refills: 2 | Status: SHIPPED | OUTPATIENT
Start: 2024-06-05 | End: 2024-06-12 | Stop reason: SDUPTHER

## 2024-06-05 RX ORDER — INSULIN GLARGINE 100 [IU]/ML
34 INJECTION, SOLUTION SUBCUTANEOUS DAILY
Qty: 15 ML | Refills: 0 | Status: SHIPPED | OUTPATIENT
Start: 2024-06-05 | End: 2024-07-05

## 2024-06-05 NOTE — PROGRESS NOTES
Venipuncture Blood Specimen Collection  Venipuncture performed in right arm by Kami Staples MA with good hemostasis. Patient tolerated the procedure well without complications.   06/05/24   Kami Staples MA

## 2024-06-06 LAB
25(OH)D3+25(OH)D2 SERPL-MCNC: 80.9 NG/ML (ref 30–100)
ALBUMIN SERPL-MCNC: 4.5 G/DL (ref 3.5–5.2)
ALBUMIN/GLOB SERPL: 1.7 G/DL
ALP SERPL-CCNC: 70 U/L (ref 39–117)
ALT SERPL-CCNC: 14 U/L (ref 1–33)
AST SERPL-CCNC: 37 U/L (ref 1–32)
BILIRUB SERPL-MCNC: 0.4 MG/DL (ref 0–1.2)
BUN SERPL-MCNC: 14 MG/DL (ref 8–23)
BUN/CREAT SERPL: 17.3 (ref 7–25)
CALCIUM SERPL-MCNC: 9.9 MG/DL (ref 8.6–10.5)
CHLORIDE SERPL-SCNC: 100 MMOL/L (ref 98–107)
CHOLEST SERPL-MCNC: 134 MG/DL (ref 0–200)
CO2 SERPL-SCNC: 28.9 MMOL/L (ref 22–29)
CREAT SERPL-MCNC: 0.81 MG/DL (ref 0.57–1)
EGFRCR SERPLBLD CKD-EPI 2021: 76.3 ML/MIN/1.73
GLOBULIN SER CALC-MCNC: 2.7 GM/DL
GLUCOSE SERPL-MCNC: 140 MG/DL (ref 65–99)
HBA1C MFR BLD: 7.5 % (ref 4.8–5.6)
HDLC SERPL-MCNC: 48 MG/DL (ref 40–60)
LDLC SERPL CALC-MCNC: 61 MG/DL (ref 0–100)
POTASSIUM SERPL-SCNC: 4.3 MMOL/L (ref 3.5–5.2)
PROT SERPL-MCNC: 7.2 G/DL (ref 6–8.5)
SODIUM SERPL-SCNC: 141 MMOL/L (ref 136–145)
TRIGL SERPL-MCNC: 148 MG/DL (ref 0–150)
VLDLC SERPL CALC-MCNC: 25 MG/DL (ref 5–40)

## 2024-06-08 DIAGNOSIS — E55.9 VITAMIN D DEFICIENCY: ICD-10-CM

## 2024-06-10 RX ORDER — ERGOCALCIFEROL 1.25 MG/1
50000 CAPSULE ORAL WEEKLY
Qty: 4 CAPSULE | Refills: 1 | Status: SHIPPED | OUTPATIENT
Start: 2024-06-10

## 2024-06-12 DIAGNOSIS — E78.2 MIXED HYPERLIPIDEMIA: ICD-10-CM

## 2024-06-12 DIAGNOSIS — M54.50 CHRONIC BILATERAL LOW BACK PAIN WITHOUT SCIATICA: ICD-10-CM

## 2024-06-12 DIAGNOSIS — G89.29 CHRONIC BILATERAL LOW BACK PAIN WITHOUT SCIATICA: ICD-10-CM

## 2024-06-12 DIAGNOSIS — M19.90 ARTHRITIS: ICD-10-CM

## 2024-06-12 RX ORDER — ATORVASTATIN CALCIUM 80 MG/1
80 TABLET, FILM COATED ORAL NIGHTLY
Qty: 90 TABLET | Refills: 2 | Status: SHIPPED | OUTPATIENT
Start: 2024-06-12

## 2024-06-12 RX ORDER — METFORMIN HYDROCHLORIDE 500 MG/1
500 TABLET, EXTENDED RELEASE ORAL
Qty: 30 TABLET | Refills: 2 | Status: SHIPPED | OUTPATIENT
Start: 2024-06-12

## 2024-06-12 RX ORDER — CELECOXIB 100 MG/1
100 CAPSULE ORAL DAILY
Qty: 30 CAPSULE | Refills: 2 | Status: SHIPPED | OUTPATIENT
Start: 2024-06-12

## 2024-06-13 ENCOUNTER — TELEPHONE (OUTPATIENT)
Dept: FAMILY MEDICINE CLINIC | Facility: CLINIC | Age: 74
End: 2024-06-13
Payer: MEDICARE

## 2024-06-13 NOTE — TELEPHONE ENCOUNTER
----- Message from Gustavo Guajardo sent at 6/13/2024 11:23 AM EDT -----  Your A1c has improved to 7.5%, but would like to continue with the increase in medication.  No other changes needed at this time

## 2024-06-13 NOTE — TELEPHONE ENCOUNTER
"Relay Lm    \"Your A1c has improved to 7.5%, but would like to continue with the increase in medication.  No other changes needed at this time \"                "

## 2024-06-13 NOTE — PROGRESS NOTES
Gustavo Guajardo DO  [facility] PRIMARY [facility]ARE  [facility]  [place] [id number] [phone]    Subjective      Name Cristel Ojeda MRN [id number]    1950 AGE/SEX 74 y.o. / female      Chief Complaint Chief Complaint   Patient presents with    Diabetes     Check up and medication refills          Visit History for  2024    Cristel Ojeda is a 74 y.o. female who presented today for Diabetes (Check up and medication refills )     History of Present Illness  The patient presents for evaluation of multiple medical concerns. She is accompanied by her     The patient's blood glucose level was recorded at 140 this morning. The accompanying adult female has expressed concerns regarding the patient's glycemic control, noting that the patient's blood glucose levels fluctuate between high and low. The patient's food intake is minimal, with a preference for koenig and eggs, approximately 2 to 3 times per week. The patient is currently on a regimen of 32 units of Lantus.    Supplemental Information  She had cataract surgery last week and is feeling better than she did last week. The surgery went okay, but floaters were so bad on Thursday and Friday that she had to go back to [place] and they gave her some drops to stop the floaters. She is going back next week to have the other eye done.       Medications and Allergies   Current Outpatient Medications   Medication Instructions    atorvastatin (LIPITOR) 80 mg, Oral, Nightly, For High Cholesterol    celecoxib (CELEBREX) 100 mg, Oral, Daily    cyclobenzaprine (FLEXERIL) 10 mg, Oral, 3 Times Daily PRN    Diclofenac Sodium (VOLTAREN) 4 g, Topical, 2 Times Daily    glucose blood test strip 1 each, Other, 4 Times Daily Before Meals & Nightly, Use as instructed    glucose monitor monitoring kit 1 each, Does not apply, 4 Times Daily Before Meals & Nightly    Lancets (accu-chek safe-t pro) lancets 1 each, Other, 4 Times Daily Before Meals &  "Nightly    Lantus SoloStar 34 Units, Subcutaneous, Daily    lisinopril-hydrochlorothiazide (PRINZIDE,ZESTORETIC) 20-12.5 MG per tablet     metFORMIN ER (GLUCOPHAGE-XR) 500 mg, Oral, Daily With Breakfast, For diabetes    predniSONE 5 MG (21) tablet therapy pack dose pack Take as directed on package instructions.    vitamin D (ERGOCALCIFEROL) 50,000 Units, Oral, Weekly     No Known Allergies   I have reviewed the above medications and allergies     Objective:      Vitals Vitals:    06/05/24 1218   BP: 116/64   BP Location: Right arm   Patient Position: Sitting   Cuff Size: Small Adult   Pulse: 78   Resp: 18   SpO2: 97%   Weight: 45.5 kg (100 lb 6.4 oz)   Height: 157.5 cm (62\")     Body mass index is 18.36 kg/m².    Physical Exam  Vitals reviewed.   Constitutional:       General: She is not in acute distress.     Appearance: She is not ill-appearing.   Pulmonary:      Effort: Pulmonary effort is normal.   Psychiatric:         Mood and Affect: Mood normal.         Behavior: Behavior normal.         Thought Content: Thought content normal.         Judgment: Judgment normal.        Physical Exam       Results  Laboratory Studies  Blood sugar was 140.     Assessment/Plan   Issues Addressed/ Plan   Diagnosis Plan   1. Vitamin D deficiency  Vitamin D 25 hydroxy      2. Type 2 diabetes mellitus with hyperglycemia, without long-term current use of insulin  Hemoglobin A1c    Comprehensive Metabolic Panel    Lantus SoloStar 100 UNIT/ML injection pen      3. Mixed hyperlipidemia  Lipid Panel         Assessment & Plan  1. Diabetes Mellitus.  The patient's weight has remained stable since her last visit. A hemoglobin A1c test will be conducted today. The patient's Lantus dosage will be increased to 34 units. Additionally, she was advised to increase her protein intake, consuming at least 2 protein shakes daily. Samples of protein shakes will be provided to her today. Should her hemoglobin A1c remain elevated, we will consider " initiating mealtime insulin therapy. If she requires mealtime insulin, a referral to an endocrinologist will be considered.     BMI is below normal parameters (malnutrition). Recommendations: treating the underlying disease process     There are no Patient Instructions on file for this visit.   Follow up  recommended Return in about 3 months (around 9/5/2024) for Diabetes.   - Dragon voice recognition software was utilized to complete this chart.  Every reasonable attempt was made to edit and correct the text, however some incorrect words may remain.   Gustavo Guajardo DO    Patient or patient representative verbalized consent for the use of Ambient Listening during the visit with  Gustavo Guajardo DO for chart documentation. 6/13/2024  11:19 EDT

## 2024-07-10 ENCOUNTER — TELEPHONE (OUTPATIENT)
Dept: FAMILY MEDICINE CLINIC | Facility: CLINIC | Age: 74
End: 2024-07-10
Payer: MEDICARE

## 2024-07-10 NOTE — TELEPHONE ENCOUNTER
Hub staff attempted to follow warm transfer process and was unsuccessful     Caller: DEMIAN     Relationship to patient:     Best call back number: [phone]    Patient is needing: VERBAL VERIFICATION FOR PATIENT'S CHRONIC DISORDER.  PLEASE USE REFERENCE NUMBER [id number] WHEN CALLING.

## 2024-07-18 ENCOUNTER — TELEPHONE (OUTPATIENT)
Dept: FAMILY MEDICINE CLINIC | Facility: CLINIC | Age: 74
End: 2024-07-18
Payer: MEDICARE

## 2024-07-18 NOTE — TELEPHONE ENCOUNTER
Caller: Cristel Ojeda    Relationship: Self    Best call back number: [phone]    Who are you requesting to speak with (clinical staff, provider,  specific staff member): CLINICAL STAFF    What was the call regarding: STATED THAT THEY HAD GOTTEN A LETTER FROM [facility] WITH SOME PAPERWORK THAT NEEDS TO BE FILLED OUT. STATED THAT THEY WOULD LIKE TO KNOW IF DR. BLAKELY HAD GOTTEN THE FORMS AND WOULD BE ABLE TO FILL IT OUT FOR THEM BEFORE THE END OF THE MONTH SO THEY DO NOT LOSE THEIR INSURANCE. PLEASE CALL AND ADVISE

## 2024-08-04 DIAGNOSIS — E55.9 VITAMIN D DEFICIENCY: ICD-10-CM

## 2024-08-05 RX ORDER — ERGOCALCIFEROL 1.25 MG/1
50000 CAPSULE ORAL WEEKLY
Qty: 4 CAPSULE | Refills: 1 | Status: SHIPPED | OUTPATIENT
Start: 2024-08-05

## 2024-08-25 DIAGNOSIS — M19.90 ARTHRITIS: ICD-10-CM

## 2024-08-25 DIAGNOSIS — G89.29 CHRONIC BILATERAL LOW BACK PAIN WITHOUT SCIATICA: ICD-10-CM

## 2024-08-25 DIAGNOSIS — M54.50 CHRONIC BILATERAL LOW BACK PAIN WITHOUT SCIATICA: ICD-10-CM

## 2024-08-26 RX ORDER — CELECOXIB 100 MG/1
100 CAPSULE ORAL DAILY
Qty: 90 CAPSULE | Refills: 3 | Status: SHIPPED | OUTPATIENT
Start: 2024-08-26

## 2024-08-28 ENCOUNTER — TELEPHONE (OUTPATIENT)
Dept: FAMILY MEDICINE CLINIC | Facility: CLINIC | Age: 74
End: 2024-08-28
Payer: MEDICARE

## 2024-08-28 DIAGNOSIS — E11.65 TYPE 2 DIABETES MELLITUS WITH HYPERGLYCEMIA, WITHOUT LONG-TERM CURRENT USE OF INSULIN: ICD-10-CM

## 2024-08-28 RX ORDER — INSULIN GLARGINE 100 [IU]/ML
34 INJECTION, SOLUTION SUBCUTANEOUS DAILY
Qty: 15 ML | Refills: 0 | Status: SHIPPED | OUTPATIENT
Start: 2024-08-28 | End: 2024-09-27

## 2024-08-28 NOTE — TELEPHONE ENCOUNTER
Patient is requesting a refill on insulin medication. Patient is request ing medication  To [facility] [place].      Lantus Solo Star 100 unit/ml inj pen

## 2024-09-20 RX ORDER — METFORMIN HCL 500 MG
500 TABLET, EXTENDED RELEASE 24 HR ORAL
Qty: 90 TABLET | Refills: 3 | Status: SHIPPED | OUTPATIENT
Start: 2024-09-20

## 2024-09-29 DIAGNOSIS — E55.9 VITAMIN D DEFICIENCY: ICD-10-CM

## 2024-09-30 RX ORDER — ERGOCALCIFEROL 1.25 MG/1
50000 CAPSULE, LIQUID FILLED ORAL WEEKLY
Qty: 4 CAPSULE | Refills: 1 | Status: SHIPPED | OUTPATIENT
Start: 2024-09-30

## 2024-10-01 ENCOUNTER — TELEPHONE (OUTPATIENT)
Dept: FAMILY MEDICINE CLINIC | Facility: CLINIC | Age: 74
End: 2024-10-01
Payer: MEDICARE

## 2024-10-01 DIAGNOSIS — E11.65 TYPE 2 DIABETES MELLITUS WITH HYPERGLYCEMIA, WITHOUT LONG-TERM CURRENT USE OF INSULIN: ICD-10-CM

## 2024-10-01 RX ORDER — INSULIN GLARGINE 100 [IU]/ML
34 INJECTION, SOLUTION SUBCUTANEOUS DAILY
Qty: 15 ML | Refills: 1 | Status: SHIPPED | OUTPATIENT
Start: 2024-10-01 | End: 2024-10-31

## 2024-10-01 NOTE — TELEPHONE ENCOUNTER
Patient is requesting medication lantus solostar 100unit/ml inj pen to be reilled and sent to [facility].                                                        Thank you

## 2024-10-09 ENCOUNTER — TRANSCRIBE ORDERS (OUTPATIENT)
Dept: ADMINISTRATIVE | Facility: HOSPITAL | Age: 74
End: 2024-10-09
Payer: MEDICARE

## 2024-10-09 DIAGNOSIS — Z12.31 SCREENING MAMMOGRAM, ENCOUNTER FOR: Primary | ICD-10-CM

## 2024-11-27 DIAGNOSIS — E11.65 TYPE 2 DIABETES MELLITUS WITH HYPERGLYCEMIA, WITHOUT LONG-TERM CURRENT USE OF INSULIN: ICD-10-CM

## 2024-11-27 RX ORDER — LANCETS 23 GAUGE
1 EACH MISCELLANEOUS
Qty: 200 EACH | Refills: 12 | Status: SHIPPED | OUTPATIENT
Start: 2024-11-27

## 2024-11-27 RX ORDER — INSULIN GLARGINE 100 [IU]/ML
34 INJECTION, SOLUTION SUBCUTANEOUS DAILY
Qty: 6 ML | Refills: 1 | Status: SHIPPED | OUTPATIENT
Start: 2024-11-27 | End: 2024-12-27

## 2024-11-27 NOTE — TELEPHONE ENCOUNTER
Caller: Cristel Ojeda    Relationship: Self    Best call back number: [phone]    Requested Prescriptions:   Requested Prescriptions     Pending Prescriptions Disp Refills    Lancets (accu-chek safe-t pro) lancets 200 each 12     Si each by Other route 4 (Four) Times a Day Before Meals & at Bedtime.        Pharmacy where request should be sent: [id number] [place], [place] - [address] [facility] - [phone] [facility] [phone] FX     Last office visit with prescribing clinician: 2024   Last telemedicine visit with prescribing clinician: Visit date not found   Next office visit with prescribing clinician: 2025     Additional details provided by patient: PATIENT NEEDS REFILL     Does the patient have less than a 3 day supply:  [] Yes  [] No    Would you like a call back once the refill request has been completed: [] Yes [x] No    If the office needs to give you a call back, can they leave a voicemail: [] Yes [x] No    Almaz Rivera Rep   24 08:45 EST

## 2024-11-30 DIAGNOSIS — E55.9 VITAMIN D DEFICIENCY: ICD-10-CM

## 2024-12-02 RX ORDER — ERGOCALCIFEROL 1.25 MG/1
50000 CAPSULE, LIQUID FILLED ORAL WEEKLY
Qty: 4 CAPSULE | Refills: 1 | Status: SHIPPED | OUTPATIENT
Start: 2024-12-02

## 2024-12-06 ENCOUNTER — HOSPITAL ENCOUNTER (OUTPATIENT)
Dept: MAMMOGRAPHY | Facility: HOSPITAL | Age: 74
Discharge: HOME | End: 2024-12-06
Admitting: STUDENT IN AN ORGANIZED HEALTH CARE EDUCATION/TRAINING PROGRAM
Payer: MEDICARE

## 2024-12-06 DIAGNOSIS — Z12.31 SCREENING MAMMOGRAM, ENCOUNTER FOR: ICD-10-CM

## 2024-12-06 PROCEDURE — 77067 SCR MAMMO BI INCL CAD: CPT

## 2024-12-06 PROCEDURE — 77063 BREAST TOMOSYNTHESIS BI: CPT

## 2024-12-09 ENCOUNTER — TELEPHONE (OUTPATIENT)
Dept: FAMILY MEDICINE CLINIC | Facility: CLINIC | Age: 74
End: 2024-12-09
Payer: MEDICARE

## 2024-12-09 NOTE — TELEPHONE ENCOUNTER
Caller: Cristel Ojeda    Relationship: Self    Best call back number: [phone]    What test was performed: MAMMOGRAM     When was the test performed: 12/6/24    Additional notes: PATIENT IS REQUESTING A CALL BACK WITH RESULTS.

## 2024-12-09 NOTE — TELEPHONE ENCOUNTER
Name: OjedaDaniellety      Relationship: Self      Best Callback Number: [phone]      HUB PROVIDED THE RELAY MESSAGE FROM THE OFFICE      PATIENT: VOICED UNDERSTANDING AND HAS NO FURTHER QUESTIONS AT THIS TIME    ADDITIONAL INFORMATION:

## 2024-12-09 NOTE — TELEPHONE ENCOUNTER
"Relay     \"Results have not been finalized yet. As soon as they become available Dr. Guajardo will review them and we will give you a call  \"                  "

## 2024-12-10 NOTE — TELEPHONE ENCOUNTER
Name: Cristel Ojeda      Relationship: Self      Best Callback Number:       HUB PROVIDED THE RELAY MESSAGE FROM THE OFFICE      PATIENT: VOICED UNDERSTANDING AND HAS NO FURTHER QUESTIONS AT THIS TIME    ADDITIONAL INFORMATION:

## 2024-12-10 NOTE — TELEPHONE ENCOUNTER
Caller: Cristel Ojeda    Relationship: Self    Best call back number: [phone]        What was the call regarding: PATIENT CALLING TO GET RESULTS FOR MAMMOGRAM THAT WAS COMPLETED    PLEASE GIVE CALLBACK

## 2024-12-10 NOTE — TELEPHONE ENCOUNTER
"Relay     \"\"Results have not been finalized yet. As soon as they become available Dr. Guajardo will review them and we will give you a call  \"                  "

## 2024-12-16 ENCOUNTER — TELEPHONE (OUTPATIENT)
Dept: FAMILY MEDICINE CLINIC | Facility: CLINIC | Age: 74
End: 2024-12-16
Payer: MEDICARE

## 2024-12-16 NOTE — TELEPHONE ENCOUNTER
Caller: Cristel Ojeda    Relationship: Self    Best call back number: [phone]     Caller requesting test results:     What test was performed: MAMMOGRAM     When was the test performed: 12/6    Where was the test performed: [facility]     Additional notes: PATIENT WOULD LIKE TO KNOW IF HER TEST RESULTS ARE BACK     PATIENT IS REQUESTING A CALL FROM DR BLAKELY

## 2025-01-02 ENCOUNTER — OFFICE VISIT (OUTPATIENT)
Dept: FAMILY MEDICINE CLINIC | Facility: CLINIC | Age: 75
End: 2025-01-02
Payer: MEDICARE

## 2025-01-02 VITALS
BODY MASS INDEX: 20.43 KG/M2 | WEIGHT: 111 LBS | RESPIRATION RATE: 18 BRPM | OXYGEN SATURATION: 98 % | HEIGHT: 62 IN | HEART RATE: 77 BPM | SYSTOLIC BLOOD PRESSURE: 128 MMHG | DIASTOLIC BLOOD PRESSURE: 82 MMHG

## 2025-01-02 DIAGNOSIS — Z00.00 MEDICARE ANNUAL WELLNESS VISIT, SUBSEQUENT: Primary | ICD-10-CM

## 2025-01-02 DIAGNOSIS — F41.1 GENERALIZED ANXIETY DISORDER: ICD-10-CM

## 2025-01-02 DIAGNOSIS — E11.65 TYPE 2 DIABETES MELLITUS WITH HYPERGLYCEMIA, WITHOUT LONG-TERM CURRENT USE OF INSULIN: ICD-10-CM

## 2025-01-02 DIAGNOSIS — E78.2 MIXED HYPERLIPIDEMIA: ICD-10-CM

## 2025-01-02 LAB
ALBUMIN SERPL-MCNC: 4.3 G/DL (ref 3.5–5.2)
ALBUMIN/GLOB SERPL: 1.5 G/DL
ALP SERPL-CCNC: 77 U/L (ref 39–117)
ALT SERPL-CCNC: 14 U/L (ref 1–33)
AST SERPL-CCNC: 35 U/L (ref 1–32)
BILIRUB SERPL-MCNC: 0.4 MG/DL (ref 0–1.2)
BUN SERPL-MCNC: 16 MG/DL (ref 8–23)
BUN/CREAT SERPL: 16.3 (ref 7–25)
CALCIUM SERPL-MCNC: 10 MG/DL (ref 8.6–10.5)
CHLORIDE SERPL-SCNC: 104 MMOL/L (ref 98–107)
CHOLEST SERPL-MCNC: 127 MG/DL (ref 0–200)
CO2 SERPL-SCNC: 30.2 MMOL/L (ref 22–29)
CREAT SERPL-MCNC: 0.98 MG/DL (ref 0.57–1)
EGFRCR SERPLBLD CKD-EPI 2021: 60.7 ML/MIN/1.73
GLOBULIN SER CALC-MCNC: 2.8 GM/DL
GLUCOSE SERPL-MCNC: 84 MG/DL (ref 65–99)
HBA1C MFR BLD: 7.2 % (ref 4.8–5.6)
HDLC SERPL-MCNC: 43 MG/DL (ref 40–60)
LDLC SERPL CALC-MCNC: 64 MG/DL (ref 0–100)
POTASSIUM SERPL-SCNC: 4.2 MMOL/L (ref 3.5–5.2)
PROT SERPL-MCNC: 7.1 G/DL (ref 6–8.5)
SODIUM SERPL-SCNC: 141 MMOL/L (ref 136–145)
TRIGL SERPL-MCNC: 110 MG/DL (ref 0–150)
VLDLC SERPL CALC-MCNC: 20 MG/DL (ref 5–40)

## 2025-01-02 RX ORDER — ESCITALOPRAM OXALATE 10 MG/1
10 TABLET ORAL DAILY
Qty: 30 TABLET | Refills: 2 | Status: SHIPPED | OUTPATIENT
Start: 2025-01-02

## 2025-01-02 NOTE — PROGRESS NOTES
Subjective   The ABCs of the Annual Wellness Visit  Medicare Wellness Visit      Cristel Ojeda is a 74 y.o. patient who presents for a Medicare Wellness Visit.    The following portions of the patient's history were reviewed and   updated as appropriate: allergies, current medications, past family history, past medical history, past social history, past surgical history, and problem list.    Compared to one year ago, the patient's physical   health is the same.  Walking has been a little more difficult  Compared to one year ago, the patient's mental   health is worse.  Having some increased stress.      Recent Hospitalizations:  She was not admitted to the hospital during the last year.     Current Medical Providers:  Patient Care Team:  Gustavo Guajardo DO as PCP - General (Family Medicine)    Outpatient Medications Prior to Visit   Medication Sig Dispense Refill    atorvastatin (LIPITOR) 80 MG tablet Take 1 tablet by mouth Every Night. For High Cholesterol 90 tablet 2    celecoxib (CeleBREX) 100 MG capsule TAKE 1 CAPSULE EVERY DAY 90 capsule 3    cyclobenzaprine (FLEXERIL) 10 MG tablet Take 1 tablet by mouth 3 (Three) Times a Day As Needed for Muscle Spasms. 21 tablet 0    Lantus SoloStar 100 UNIT/ML injection pen Inject 34 Units under the skin into the appropriate area as directed Daily for 30 days. Indications: Type 2 Diabetes 6 mL 1    lisinopril-hydrochlorothiazide (PRINZIDE,ZESTORETIC) 20-12.5 MG per tablet       metFORMIN ER (GLUCOPHAGE-XR) 500 MG 24 hr tablet TAKE 1 TABLET BY MOUTH DAILY WITH BREAKFAST. FOR DIABETES 90 tablet 3    vitamin D (ERGOCALCIFEROL) 1.25 MG (66774 UT) capsule capsule TAKE 1 CAPSULE BY MOUTH ONCE WEEKLY 4 capsule 1    Diclofenac Sodium (VOLTAREN) 1 % gel gel Apply 4 g topically to the appropriate area as directed 2 (Two) Times a Day. (Patient not taking: Reported on 1/2/2025) 100 g 0    glucose blood test strip 1 each by Other route 4 (Four) Times a Day Before Meals & at Bedtime. Use as  "instructed 200 each 1    glucose monitor monitoring kit Use 1 each 4 (Four) Times a Day Before Meals & at Bedtime. 1 each 0    Lancets (accu-chek safe-t pro) lancets 1 each by Other route 4 (Four) Times a Day Before Meals & at Bedtime. 200 each 12     No facility-administered medications prior to visit.     No opioid medication identified on active medication list. I have reviewed chart for other potential  high risk medication/s and harmful drug interactions in the elderly.      Aspirin is not on active medication list.  Aspirin use is not indicated based on review of current medical condition/s. Risk of harm outweighs potential benefits.  .    There is no problem list on file for this patient.    Advance Care Planning Advance Directive is not on file.  ACP discussion was held with the patient during this visit. Patient does not have an advance directive, information provided.            Objective   Vitals:    01/02/25 0918   BP: 128/82   BP Location: Left arm   Patient Position: Sitting   Cuff Size: Adult   Pulse: 77   Resp: 18   SpO2: 98%   Weight: 50.3 kg (111 lb)   Height: 157.5 cm (62\")       Estimated body mass index is 20.3 kg/m² as calculated from the following:    Height as of this encounter: 157.5 cm (62\").    Weight as of this encounter: 50.3 kg (111 lb).    BMI is within normal parameters. No other follow-up for BMI required.    Does the patient have evidence of cognitive impairment? No, Mini cog is negative today in office.                                                                                                 Health  Risk Assessment    Smoking Status:  Social History     Tobacco Use   Smoking Status Some Days    Types: Cigarettes    Passive exposure: Current   Smokeless Tobacco Never     Alcohol Consumption:  Social History     Substance and Sexual Activity   Alcohol Use No       Fall Risk Screen  STEADI Fall Risk Assessment was completed, and patient is at MODERATE risk for falls. Assessment " completed on:2025    Depression Screening   Little interest or pleasure in doing things? Not at all   Feeling down, depressed, or hopeless? Not at all   PHQ-2 Total Score 0   GAD7 DOCUMENTATION    Feeling nervous, anxious or on edge 2   Not being able to stop or control worrying 3   Worrying too much about different things 3   Trouble relaxing 1   Being so restless that it is hard to sit still 1   Becoming easily annoyed or irritable 3   Feeling Afraid as if something awful might happen 3   SHANON Total Score 16   If you checked any problems, how difficult have these problems made it for you to do your work, take care of things at home or get along with other people? Somewhat difficult      Health Habits and Functional and Cognitive Screenin/2/2025     9:21 AM   Functional & Cognitive Status   Do you have difficulty preparing food and eating? No   Do you have difficulty bathing yourself, getting dressed or grooming yourself? No   Do you have difficulty using the toilet? No   Do you have difficulty moving around from place to place? No   Do you have trouble with steps or getting out of a bed or a chair? Yes   Current Diet Frequent Junk Food   Dental Exam Up to date   Eye Exam Up to date   Exercise (times per week) 0 times per week   Current Exercises Include No Regular Exercise   Do you need help using the phone?  No   Are you deaf or do you have serious difficulty hearing?  No   Do you need help to go to places out of walking distance? No   Do you need help shopping? No   Do you need help preparing meals?  No   Do you need help with housework?  No   Do you need help with laundry? No   Do you need help taking your medications? No   Do you need help managing money? No   Do you ever drive or ride in a car without wearing a seat belt? Yes   Have you felt unusual stress, anger or loneliness in the last month? Yes   Who do you live with? Spouse   If you need help, do you have trouble finding someone available to  you? No   Have you been bothered in the last four weeks by sexual problems? No   Do you have difficulty concentrating, remembering or making decisions? Yes           Age-appropriate Screening Schedule:  Refer to the list below for future screening recommendations based on patient's age, sex and/or medical conditions. Orders for these recommended tests are listed in the plan section. The patient has been provided with a written plan.    Health Maintenance List  Health Maintenance   Topic Date Due    DXA SCAN  Never done    DIABETIC FOOT EXAM  Never done    HEPATITIS C SCREENING  Never done    ANNUAL WELLNESS VISIT  Never done    Pneumococcal Vaccine 65+ (2 of 2 - PPSV23 or PCV20) 03/07/2019    TDAP/TD VACCINES (2 - Td or Tdap) 09/30/2023    COVID-19 Vaccine (6 - 2024-25 season) 09/01/2024    HEMOGLOBIN A1C  12/05/2024    DIABETIC EYE EXAM  05/06/2025    LIPID PANEL  06/05/2025    COLORECTAL CANCER SCREENING  11/28/2026    MAMMOGRAM  12/06/2026    INFLUENZA VACCINE  Completed    ZOSTER VACCINE  Completed    URINE MICROALBUMIN  Discontinued                                                                                                                                                CMS Preventative Services Quick Reference  Risk Factors Identified During Encounter  None Identified    The above risks/problems have been discussed with the patient.  Pertinent information has been shared with the patient in the After Visit Summary.  An After Visit Summary and PPPS were made available to the patient.    Follow Up:   Next Medicare Wellness visit to be scheduled in 1 year.     Assessment & Plan  Medicare annual wellness visit, subsequent  Assessment & Plan  Anxiety  Anxiety most likely exacerbated by her current work environment. A prescription for Lexapro 10 mg has been provided, with instructions to halve the dose for the initial week before progressing to the full dose. She should observe an improvement in her symptoms  within a 2-week period. Potential side effects, including diarrhea, headaches, and increased anxiety, have been discussed. This medication is intended for temporary use, with a plan to gradually discontinue it once her situation improves.    Her weight gain is likely a consequence of her dietary habits, particularly her consumption of potato chips, which are high in carbohydrates and corn syrup. This, coupled with her insulin use, is contributing to her weight increase. She has been advised to modify her diet, specifically by reducing her intake of potato chips and considering healthier alternatives such as popcorn or vegetable chips. She has also been encouraged to incorporate regular physical activity into her routine, aiming for movement every 2 hours.           Generalized anxiety disorder      Orders:    escitalopram (Lexapro) 10 MG tablet; Take 1 tablet by mouth Daily.    Type 2 diabetes mellitus with hyperglycemia, without long-term current use of insulin      Orders:    Hemoglobin A1c    Comprehensive Metabolic Panel    Mixed hyperlipidemia       Orders:    Lipid Panel         Follow Up:   No follow-ups on file.

## 2025-01-02 NOTE — PROGRESS NOTES
Venipuncture Blood Specimen Collection  Venipuncture performed in left arm by Kami Staples MA with good hemostasis. Patient tolerated the procedure well without complications.   01/02/25   Kami Staples MA

## 2025-01-09 ENCOUNTER — TELEPHONE (OUTPATIENT)
Dept: FAMILY MEDICINE CLINIC | Facility: CLINIC | Age: 75
End: 2025-01-09
Payer: MEDICARE

## 2025-01-09 NOTE — TELEPHONE ENCOUNTER
Caller: Cristel Ojeda    Relationship: Self    Best call back number: [phone]     What test was performed: LABS    When was the test performed: 01/2/25    Where was the test performed: IN OFFICE    Additional notes: PATIENT WOULD LIKE TO GO OVER LAB RESULTS            Recommended yearly dilated eye examinations.

## 2025-01-16 NOTE — TELEPHONE ENCOUNTER
Caller: Cristel Ojeda    Relationship: Self    Best call back number: [phone]    Who are you requesting to speak with (clinical staff, provider,  specific staff member): CLINICAL STAFF    What was the call regarding: STATED THAT THEY WERE CHECKING ON THE STATUS OF THEIR REQUEST. PLEASE CALL AND ADVISE WHEN AVAILABLE

## 2025-01-23 ENCOUNTER — HOSPITAL ENCOUNTER (EMERGENCY)
Facility: HOSPITAL | Age: 75
Discharge: HOME | End: 2025-01-23
Attending: STUDENT IN AN ORGANIZED HEALTH CARE EDUCATION/TRAINING PROGRAM
Payer: MEDICARE

## 2025-01-23 ENCOUNTER — APPOINTMENT (OUTPATIENT)
Dept: CT IMAGING | Facility: HOSPITAL | Age: 75
End: 2025-01-23
Payer: MEDICARE

## 2025-01-23 VITALS
DIASTOLIC BLOOD PRESSURE: 71 MMHG | BODY MASS INDEX: 18.95 KG/M2 | OXYGEN SATURATION: 98 % | TEMPERATURE: 97.6 F | WEIGHT: 103 LBS | RESPIRATION RATE: 16 BRPM | SYSTOLIC BLOOD PRESSURE: 162 MMHG | HEIGHT: 62 IN | HEART RATE: 75 BPM

## 2025-01-23 DIAGNOSIS — R10.11 RIGHT UPPER QUADRANT ABDOMINAL PAIN: Primary | ICD-10-CM

## 2025-01-23 LAB
ALBUMIN SERPL-MCNC: 4.1 G/DL (ref 3.5–5.2)
ALBUMIN/GLOB SERPL: 1.3 G/DL
ALP SERPL-CCNC: 71 U/L (ref 39–117)
ALT SERPL W P-5'-P-CCNC: 12 U/L (ref 1–33)
ANION GAP SERPL CALCULATED.3IONS-SCNC: 10.3 MMOL/L (ref 5–15)
AST SERPL-CCNC: 38 U/L (ref 1–32)
BASOPHILS # BLD AUTO: 0.07 10*3/MM3 (ref 0–0.2)
BASOPHILS NFR BLD AUTO: 0.6 % (ref 0–1.5)
BILIRUB SERPL-MCNC: 0.4 MG/DL (ref 0–1.2)
BILIRUB UR QL STRIP: NEGATIVE
BUN SERPL-MCNC: 19 MG/DL (ref 8–23)
BUN/CREAT SERPL: 22.9 (ref 7–25)
CALCIUM SPEC-SCNC: 9.3 MG/DL (ref 8.6–10.5)
CHLORIDE SERPL-SCNC: 101 MMOL/L (ref 98–107)
CLARITY UR: CLEAR
CO2 SERPL-SCNC: 23.7 MMOL/L (ref 22–29)
COLOR UR: YELLOW
CREAT SERPL-MCNC: 0.83 MG/DL (ref 0.57–1)
DEPRECATED RDW RBC AUTO: 40.2 FL (ref 37–54)
EGFRCR SERPLBLD CKD-EPI 2021: 74.1 ML/MIN/1.73
EOSINOPHIL # BLD AUTO: 0.16 10*3/MM3 (ref 0–0.4)
EOSINOPHIL NFR BLD AUTO: 1.5 % (ref 0.3–6.2)
ERYTHROCYTE [DISTWIDTH] IN BLOOD BY AUTOMATED COUNT: 12.3 % (ref 12.3–15.4)
GLOBULIN UR ELPH-MCNC: 3.1 GM/DL
GLUCOSE SERPL-MCNC: 197 MG/DL (ref 65–99)
GLUCOSE UR STRIP-MCNC: NEGATIVE MG/DL
HCT VFR BLD AUTO: 37.3 % (ref 34–46.6)
HGB BLD-MCNC: 12.3 G/DL (ref 12–15.9)
HGB UR QL STRIP.AUTO: NEGATIVE
IMM GRANULOCYTES # BLD AUTO: 0.03 10*3/MM3 (ref 0–0.05)
IMM GRANULOCYTES NFR BLD AUTO: 0.3 % (ref 0–0.5)
KETONES UR QL STRIP: ABNORMAL
LEUKOCYTE ESTERASE UR QL STRIP.AUTO: NEGATIVE
LIPASE SERPL-CCNC: 20 U/L (ref 13–60)
LYMPHOCYTES # BLD AUTO: 3.14 10*3/MM3 (ref 0.7–3.1)
LYMPHOCYTES NFR BLD AUTO: 28.8 % (ref 19.6–45.3)
MAGNESIUM SERPL-MCNC: 2.1 MG/DL (ref 1.6–2.4)
MCH RBC QN AUTO: 29.7 PG (ref 26.6–33)
MCHC RBC AUTO-ENTMCNC: 33 G/DL (ref 31.5–35.7)
MCV RBC AUTO: 90.1 FL (ref 79–97)
MONOCYTES # BLD AUTO: 1.21 10*3/MM3 (ref 0.1–0.9)
MONOCYTES NFR BLD AUTO: 11.1 % (ref 5–12)
NEUTROPHILS NFR BLD AUTO: 57.7 % (ref 42.7–76)
NEUTROPHILS NFR BLD AUTO: 6.29 10*3/MM3 (ref 1.7–7)
NITRITE UR QL STRIP: NEGATIVE
NRBC BLD AUTO-RTO: 0 /100 WBC (ref 0–0.2)
PH UR STRIP.AUTO: 7 [PH] (ref 4.5–8)
PHOSPHATE SERPL-MCNC: 3 MG/DL (ref 2.5–4.5)
PLATELET # BLD AUTO: 229 10*3/MM3 (ref 140–450)
PMV BLD AUTO: 12 FL (ref 6–12)
POTASSIUM SERPL-SCNC: 3.6 MMOL/L (ref 3.5–5.2)
PROT SERPL-MCNC: 7.2 G/DL (ref 6–8.5)
PROT UR QL STRIP: NEGATIVE
RBC # BLD AUTO: 4.14 10*6/MM3 (ref 3.77–5.28)
SODIUM SERPL-SCNC: 135 MMOL/L (ref 136–145)
SP GR UR STRIP: 1.01 (ref 1–1.03)
UROBILINOGEN UR QL STRIP: ABNORMAL
WBC NRBC COR # BLD AUTO: 10.9 10*3/MM3 (ref 3.4–10.8)

## 2025-01-23 PROCEDURE — 85025 COMPLETE CBC W/AUTO DIFF WBC: CPT | Performed by: STUDENT IN AN ORGANIZED HEALTH CARE EDUCATION/TRAINING PROGRAM

## 2025-01-23 PROCEDURE — 25010000002 ONDANSETRON PER 1 MG: Performed by: STUDENT IN AN ORGANIZED HEALTH CARE EDUCATION/TRAINING PROGRAM

## 2025-01-23 PROCEDURE — 96375 TX/PRO/DX INJ NEW DRUG ADDON: CPT

## 2025-01-23 PROCEDURE — 96374 THER/PROPH/DIAG INJ IV PUSH: CPT

## 2025-01-23 PROCEDURE — 25510000001 IOPAMIDOL PER 1 ML: Performed by: STUDENT IN AN ORGANIZED HEALTH CARE EDUCATION/TRAINING PROGRAM

## 2025-01-23 PROCEDURE — 25010000002 KETOROLAC TROMETHAMINE PER 15 MG

## 2025-01-23 PROCEDURE — 84100 ASSAY OF PHOSPHORUS: CPT | Performed by: STUDENT IN AN ORGANIZED HEALTH CARE EDUCATION/TRAINING PROGRAM

## 2025-01-23 PROCEDURE — 80053 COMPREHEN METABOLIC PANEL: CPT | Performed by: STUDENT IN AN ORGANIZED HEALTH CARE EDUCATION/TRAINING PROGRAM

## 2025-01-23 PROCEDURE — 99285 EMERGENCY DEPT VISIT HI MDM: CPT | Performed by: STUDENT IN AN ORGANIZED HEALTH CARE EDUCATION/TRAINING PROGRAM

## 2025-01-23 PROCEDURE — 83690 ASSAY OF LIPASE: CPT | Performed by: STUDENT IN AN ORGANIZED HEALTH CARE EDUCATION/TRAINING PROGRAM

## 2025-01-23 PROCEDURE — 81003 URINALYSIS AUTO W/O SCOPE: CPT | Performed by: STUDENT IN AN ORGANIZED HEALTH CARE EDUCATION/TRAINING PROGRAM

## 2025-01-23 PROCEDURE — 74177 CT ABD & PELVIS W/CONTRAST: CPT

## 2025-01-23 PROCEDURE — 25010000002 HYDROMORPHONE 1 MG/ML SOLUTION: Performed by: STUDENT IN AN ORGANIZED HEALTH CARE EDUCATION/TRAINING PROGRAM

## 2025-01-23 PROCEDURE — 83735 ASSAY OF MAGNESIUM: CPT | Performed by: STUDENT IN AN ORGANIZED HEALTH CARE EDUCATION/TRAINING PROGRAM

## 2025-01-23 RX ORDER — ONDANSETRON 2 MG/ML
4 INJECTION INTRAMUSCULAR; INTRAVENOUS ONCE
Status: COMPLETED | OUTPATIENT
Start: 2025-01-23 | End: 2025-01-23

## 2025-01-23 RX ORDER — IOPAMIDOL 755 MG/ML
100 INJECTION, SOLUTION INTRAVASCULAR
Status: COMPLETED | OUTPATIENT
Start: 2025-01-23 | End: 2025-01-23

## 2025-01-23 RX ORDER — KETOROLAC TROMETHAMINE 30 MG/ML
15 INJECTION, SOLUTION INTRAMUSCULAR; INTRAVENOUS ONCE
Status: COMPLETED | OUTPATIENT
Start: 2025-01-23 | End: 2025-01-23

## 2025-01-23 RX ORDER — KETOROLAC TROMETHAMINE 30 MG/ML
INJECTION, SOLUTION INTRAMUSCULAR; INTRAVENOUS
Status: COMPLETED
Start: 2025-01-23 | End: 2025-01-23

## 2025-01-23 RX ADMIN — HYDROMORPHONE HYDROCHLORIDE 0.5 MG: 1 INJECTION, SOLUTION INTRAMUSCULAR; INTRAVENOUS; SUBCUTANEOUS at 09:20

## 2025-01-23 RX ADMIN — IOPAMIDOL 100 ML: 755 INJECTION, SOLUTION INTRAVENOUS at 10:48

## 2025-01-23 RX ADMIN — ONDANSETRON 4 MG: 2 INJECTION INTRAMUSCULAR; INTRAVENOUS at 10:03

## 2025-01-23 RX ADMIN — KETOROLAC TROMETHAMINE 15 MG: 30 INJECTION, SOLUTION INTRAMUSCULAR; INTRAVENOUS at 12:24

## 2025-01-23 NOTE — DISCHARGE INSTRUCTIONS
As we discussed, your labs and CT scan did not identify cause of your pain.  Recommend taking Tylenol at home and drinking plenty of fluids.  Your pain worsens, or if you develop additional symptoms such as nausea, vomiting, fever do not have a bowel movement typically do, please return to the ER for further evaluation.  Additionally as we discussed I recommend you follow closely with your regular doctor.  Please call today to schedule an appointment, Friday or Monday.

## 2025-01-23 NOTE — ED PROVIDER NOTES
EMERGENCY ROOM NOTE  [facility]    SUBJECTIVE    Flank Pain (Pt has right flank pain radiating to right abdomen starting yesterday. No N,V,D, no urinary symptoms )      Cristel Ojeda is a 74 y.o. female with a past medication history of type 2 diabetes and hyperlipidemia presenting to the ER with abdominal pain.  Patient states she had acute onset right sided mid abdominal and right-sided flank pain that started yesterday.  She states the pain started out of the blue.  She describes the pain as a sharp stabbing pain.  It waxes and wanes in severity.  She states physical movement like bending over or standing up exacerbate her pain.  She denies any alleviating factors other than rest.  She has tolerated p.o. during this time, states she had coffee, Diet Coke and a peanut butter and jelly sandwich this morning that did not change her pain at all.  She has not taken anything for the pain.  She denies any associated symptoms: No nausea, vomiting, fevers, chills, dysuria, hematuria, constipation, diarrhea, hematochezia, melena, chest pain, shortness of breath, numbness or tingling.  She has never had pain like this in the past.  She denies any prior abdominal surgeries.      A 10-point review of systems was obtained and otherwise negative unless stated in the HPI    Past Medical History:   Diagnosis Date    Diabetes     Hyperlipidemia        No Known Allergies    Past Surgical History:   Procedure Laterality Date    CATARACT EXTRACTION Left 05/29/2024    Right eye will be done 6/12/2024    KNEE SURGERY      TUBAL ABDOMINAL LIGATION         Family History   Problem Relation Age of Onset    Diabetes Mother     Cancer Sister     Breast cancer Neg Hx        Social History     Socioeconomic History    Marital status:    Tobacco Use    Smoking status: Some Days     Types: Cigarettes     Passive exposure: Current    Smokeless tobacco: Never   Vaping Use    Vaping status: Never Used   Substance and Sexual  "Activity    Alcohol use: No    Drug use: Defer    Sexual activity: Defer         OBJECTIVE  VITAL SIGNS:   Vitals:    01/23/25 0905 01/23/25 1100 01/23/25 1215   BP: 166/77 163/76 162/71   Pulse: 84 87 75   Resp: 16 18 16   Temp: 97.6 °F (36.4 °C)     SpO2: 98% 97% 98%   Weight: 46.7 kg (103 lb)     Height: 157.5 cm (62\")          Constitutional: Resting in bed, in no acute distress  HENT:  Atraumatic. Normocephalic. Bilateral external ears normal. Nose normal.    Eyes:  Conjunctiva normal.  No periorbital edema.  Neck:  ROM normal, supple.    Cardiovascular:   RRR without murmurs, rubs, or gallops. Extremities appear warm and well perfused.  Thorax & Lungs:  Respiratory effort normal.  Lungs CTAB   Abdomen:  Nondistended.  Tenderness to palpation in the mid abdomen extension to the right flank.  Negative Rush's and McBurney's.  No rebound or guarding.  Musculoskeletal:  No deformity or swelling. No edema.    Skin:  Warm and dry.    Neurologic:  Awake and alert .  Psychiatric:  Affect normal. Thought process is linear and goal directed       EKG  Procedures    PROCEDURES/ULTRASOUND      ED COURSE AND MEDICAL DECISION MAKING  Pertinent Labs & Imaging studies reviewed. (See chart for details).    Cristel Ojeda is a 74 y.o. female presenting to the ER with abdominal pain.  On arrival the patient was noted to be afebrile and hemodynamically stable.  Examination notable for mid right abdominal tenderness that extends towards the right flank.  Remainder of her examination was reassuring.  Given age and history of type 2 diabetes despite only focal tenderness on exam, concern for possible acute abdominal process.  Given location of pain will consider hepatobiliary disease, ureterolithiasis, or appendicitis.  Will obtain labs including CBC, CMP, mag, Phos, urinalysis and a CT scan of the abdomen and pelvis.  Patient's pain was treated with Dilaudid.         ED Course as of 01/23/25 1522   Thu Jan 23, 2025   0959 CMP notable " for elevated glucose at 197.  Electrolytes and renal function within normal limits including magnesium and phosphorus.  AST slightly elevated at 38, remainder of her hepatic function within normal limits.  CBC notable for mild leukocytosis of 10.9, otherwise no evidence of anemia or thrombocytopenia. [CC]   1000 Patient became acutely nauseous and had an episode of vomiting. Zofran IV ordered. Awaiting CT scan. [CC]   1218 Analysis negative for UTI. [CC]   1521 CT scan did not show acute findings of patient's symptoms.  At this time patient is feeling better and pain is improved.  She was able to tolerate ice chips without nausea and vomiting.  I discussed laboratory test with patient.  Discussed her slightly elevated white blood cell count however remainder of her  laboratory testing is reassuring including CT scan of the abdomen and pelvis.  At this time discussed different etiologies of her pain including a musculoskeletal cause.  We also discussed that it is possible her workup is negative given she presented early in disease course, discussed that anytime his symptoms acutely worsen she should seek reevaluation and she expressed understanding of this.  At this time as mentioned, patient is feeling better and is able to tolerate p.o.  Will discharge home in stable condition. [CC]      ED Course User Index  [CC] Vy Diehl MD       Medications Administered  Medications   HYDROmorphone (DILAUDID) injection 0.5 mg (0.5 mg Intravenous Given 1/23/25 0920)   ondansetron (ZOFRAN) injection 4 mg (4 mg Intravenous Given 1/23/25 1003)   iopamidol (ISOVUE-370) 76 % injection 100 mL (100 mL Intravenous Given 1/23/25 1048)   ketorolac (TORADOL) injection 15 mg (15 mg Intravenous Given 1/23/25 1224)       Final diagnoses:   Right upper quadrant abdominal pain     ED Disposition       ED Disposition   Discharge    Condition   Stable    Comment   --             Gustavo Guajardo, DO  [address] [facility]  [address]  [phone]    Schedule an appointment as soon as possible for a visit on 1/27/2025         Medication List      No changes were made to your prescriptions during this visit.            In cases where narcotics are prescribed, KRISTEN report was obtained, reviewed, and made part of record. After examining available information, and risks of prescribing or dispensing controlled substances was explained to the patient (including non-treatment or other treatment), it is considered medically appropriate to administer  narcotics as prescribed.    This note was dictated using voice-recognition software, which occasionally construes inadvertent typographic errors.    MD Fazal Marie Calyn Michele, MD  01/23/25 152

## 2025-01-23 NOTE — ED NOTES
Pt unable to obtain urine sample at this time.    Partial Purse String (Simple) Text: Given the location of the defect and the characteristics of the surrounding skin a simple purse string closure was deemed most appropriate.  Undermining was performed circumfirentially around the surgical defect.  A purse string suture was then placed and tightened. Wound tension only allowed a partial closure of the circular defect.

## 2025-01-29 ENCOUNTER — TELEPHONE (OUTPATIENT)
Dept: CASE MANAGEMENT | Facility: OTHER | Age: 75
End: 2025-01-29
Payer: MEDICARE

## 2025-01-29 NOTE — TELEPHONE ENCOUNTER
Outreach attempts were made x 3 and voice mails left to follow up with recent ED visit and to offer and discuss case mansgement services with the [facility] program.   Marlena Polanco RN [facility]

## 2025-02-12 DIAGNOSIS — E55.9 VITAMIN D DEFICIENCY: ICD-10-CM

## 2025-02-12 RX ORDER — ERGOCALCIFEROL 1.25 MG/1
50000 CAPSULE, LIQUID FILLED ORAL WEEKLY
Qty: 4 CAPSULE | Refills: 1 | Status: SHIPPED | OUTPATIENT
Start: 2025-02-12

## 2025-03-19 DIAGNOSIS — E78.2 MIXED HYPERLIPIDEMIA: ICD-10-CM

## 2025-03-19 DIAGNOSIS — E11.65 TYPE 2 DIABETES MELLITUS WITH HYPERGLYCEMIA, WITHOUT LONG-TERM CURRENT USE OF INSULIN: ICD-10-CM

## 2025-03-19 RX ORDER — ATORVASTATIN CALCIUM 80 MG/1
TABLET, FILM COATED ORAL
Qty: 90 TABLET | Refills: 3 | Status: SHIPPED | OUTPATIENT
Start: 2025-03-19

## 2025-03-19 NOTE — TELEPHONE ENCOUNTER
Rx Refill Note  Requested Prescriptions      No prescriptions requested or ordered in this encounter      Last office visit with prescribing clinician: 1/2/2025   Last telemedicine visit with prescribing clinician: Visit date not found   Next office visit with prescribing clinician: 4/2/2025

## 2025-03-20 RX ORDER — INSULIN GLARGINE 100 [IU]/ML
34 INJECTION, SOLUTION SUBCUTANEOUS DAILY
Qty: 6 ML | Refills: 1 | Status: SHIPPED | OUTPATIENT
Start: 2025-03-20 | End: 2025-04-19

## 2025-04-02 ENCOUNTER — OFFICE VISIT (OUTPATIENT)
Dept: FAMILY MEDICINE CLINIC | Facility: CLINIC | Age: 75
End: 2025-04-02
Payer: MEDICARE

## 2025-04-02 VITALS
HEIGHT: 62 IN | RESPIRATION RATE: 14 BRPM | HEART RATE: 86 BPM | DIASTOLIC BLOOD PRESSURE: 72 MMHG | SYSTOLIC BLOOD PRESSURE: 136 MMHG | OXYGEN SATURATION: 98 % | WEIGHT: 101 LBS | BODY MASS INDEX: 18.58 KG/M2

## 2025-04-02 DIAGNOSIS — E78.2 MIXED HYPERLIPIDEMIA: ICD-10-CM

## 2025-04-02 DIAGNOSIS — R53.83 FATIGUE, UNSPECIFIED TYPE: ICD-10-CM

## 2025-04-02 DIAGNOSIS — E11.65 TYPE 2 DIABETES MELLITUS WITH HYPERGLYCEMIA, WITHOUT LONG-TERM CURRENT USE OF INSULIN: Primary | ICD-10-CM

## 2025-04-02 DIAGNOSIS — E55.9 VITAMIN D DEFICIENCY: ICD-10-CM

## 2025-04-02 NOTE — PROGRESS NOTES
Venipuncture Blood Specimen Collection  Venipuncture performed in left arm by Kami Staples MA with good hemostasis. Patient tolerated the procedure well without complications.   04/02/25   Kami Staples MA

## 2025-04-03 LAB
25(OH)D3+25(OH)D2 SERPL-MCNC: 100 NG/ML (ref 30–100)
ALBUMIN SERPL-MCNC: 4.6 G/DL (ref 3.8–4.8)
ALBUMIN/CREAT UR: 11 MG/G CREAT (ref 0–29)
ALP SERPL-CCNC: 77 IU/L (ref 44–121)
ALT SERPL-CCNC: 14 IU/L (ref 0–32)
AST SERPL-CCNC: 36 IU/L (ref 0–40)
BASOPHILS # BLD AUTO: 0.1 X10E3/UL (ref 0–0.2)
BASOPHILS NFR BLD AUTO: 1 %
BILIRUB SERPL-MCNC: 0.4 MG/DL (ref 0–1.2)
BUN SERPL-MCNC: 17 MG/DL (ref 8–27)
BUN/CREAT SERPL: 18 (ref 12–28)
CALCIUM SERPL-MCNC: 10.1 MG/DL (ref 8.7–10.3)
CHLORIDE SERPL-SCNC: 100 MMOL/L (ref 96–106)
CHOLEST SERPL-MCNC: 168 MG/DL (ref 100–199)
CO2 SERPL-SCNC: 24 MMOL/L (ref 20–29)
CREAT SERPL-MCNC: 0.97 MG/DL (ref 0.57–1)
CREAT UR-MCNC: 61 MG/DL
EGFRCR SERPLBLD CKD-EPI 2021: 61 ML/MIN/1.73
EOSINOPHIL # BLD AUTO: 0.1 X10E3/UL (ref 0–0.4)
EOSINOPHIL NFR BLD AUTO: 1 %
ERYTHROCYTE [DISTWIDTH] IN BLOOD BY AUTOMATED COUNT: 12.4 % (ref 11.7–15.4)
GLOBULIN SER CALC-MCNC: 2.9 G/DL (ref 1.5–4.5)
GLUCOSE SERPL-MCNC: 115 MG/DL (ref 70–99)
HBA1C MFR BLD: 7.4 % (ref 4.8–5.6)
HCT VFR BLD AUTO: 39.2 % (ref 34–46.6)
HDLC SERPL-MCNC: 46 MG/DL
HGB BLD-MCNC: 12.6 G/DL (ref 11.1–15.9)
IMM GRANULOCYTES # BLD AUTO: 0 X10E3/UL (ref 0–0.1)
IMM GRANULOCYTES NFR BLD AUTO: 0 %
LDLC SERPL CALC-MCNC: 101 MG/DL (ref 0–99)
LYMPHOCYTES # BLD AUTO: 3.2 X10E3/UL (ref 0.7–3.1)
LYMPHOCYTES NFR BLD AUTO: 32 %
MCH RBC QN AUTO: 29.2 PG (ref 26.6–33)
MCHC RBC AUTO-ENTMCNC: 32.1 G/DL (ref 31.5–35.7)
MCV RBC AUTO: 91 FL (ref 79–97)
MICROALBUMIN UR-MCNC: 6.6 UG/ML
MONOCYTES # BLD AUTO: 1 X10E3/UL (ref 0.1–0.9)
MONOCYTES NFR BLD AUTO: 10 %
NEUTROPHILS # BLD AUTO: 5.7 X10E3/UL (ref 1.4–7)
NEUTROPHILS NFR BLD AUTO: 56 %
PLATELET # BLD AUTO: 250 X10E3/UL (ref 150–450)
POTASSIUM SERPL-SCNC: 3.9 MMOL/L (ref 3.5–5.2)
PROT SERPL-MCNC: 7.5 G/DL (ref 6–8.5)
RBC # BLD AUTO: 4.32 X10E6/UL (ref 3.77–5.28)
SODIUM SERPL-SCNC: 139 MMOL/L (ref 134–144)
T3FREE SERPL-MCNC: 3 PG/ML (ref 2–4.4)
T4 FREE SERPL-MCNC: 0.99 NG/DL (ref 0.82–1.77)
TRIGL SERPL-MCNC: 119 MG/DL (ref 0–149)
TSH SERPL DL<=0.005 MIU/L-ACNC: 3.2 UIU/ML (ref 0.45–4.5)
VIT B12 SERPL-MCNC: 348 PG/ML (ref 232–1245)
VLDLC SERPL CALC-MCNC: 21 MG/DL (ref 5–40)
WBC # BLD AUTO: 10.1 X10E3/UL (ref 3.4–10.8)

## 2025-04-10 ENCOUNTER — TELEPHONE (OUTPATIENT)
Dept: FAMILY MEDICINE CLINIC | Facility: CLINIC | Age: 75
End: 2025-04-10
Payer: MEDICARE

## 2025-04-11 NOTE — TELEPHONE ENCOUNTER
Caller: Cristel Ojeda    Relationship: Self    Best call back number: [phone]      What was the call regarding: PATIENT CALLING TO GO OVER LABS FROM 4/2/25     PLEASE GIVE CALLBACK

## 2025-04-16 ENCOUNTER — TELEPHONE (OUTPATIENT)
Dept: FAMILY MEDICINE CLINIC | Facility: CLINIC | Age: 75
End: 2025-04-16
Payer: MEDICARE

## 2025-04-16 DIAGNOSIS — E11.65 TYPE 2 DIABETES MELLITUS WITH HYPERGLYCEMIA, WITHOUT LONG-TERM CURRENT USE OF INSULIN: ICD-10-CM

## 2025-04-16 RX ORDER — INSULIN GLARGINE 100 [IU]/ML
34 INJECTION, SOLUTION SUBCUTANEOUS DAILY
Qty: 6 ML | Refills: 1 | Status: SHIPPED | OUTPATIENT
Start: 2025-04-16 | End: 2025-05-16

## 2025-04-16 NOTE — TELEPHONE ENCOUNTER
Caller: Cristel Ojeda    Relationship: Self    Best call back number: [phone]      What test was performed: LABS    When was the test performed: 4/2/25    Where was the test performed:     Additional notes: STILL HAS NOT RECEIVED A CALL BACK WITH THE RESULTS      WILL BE HOME ON 4/16/25, WILL BE EXPECTING A CALL BACK

## 2025-04-16 NOTE — PROGRESS NOTES
Gustavo Guajardo,   [facility]  [address]  [place] [id number] [phone]    Subjective      Name Cristel Ojeda MRN [id number]    1950 AGE/SEX 75 y.o. / female      Chief Complaint Chief Complaint   Patient presents with    Diabetes     3 month check up    Fatigue     Stays tried all the time          Visit History for  2025    Cristel Ojeda is a 75 y.o. female who presented today for Diabetes (3 month check up) and Fatigue (Stays tried all the time )       History of Present Illness  The patient is a 75-year-old female who presents for evaluation of fatigue.    She reports persistent fatigue, which she attributes to her demanding job. Her sleep pattern is characterized by early bedtime around 7:30 PM, awakening at 1:00 AM, and resuming sleep until 4:00 AM. Despite this, she feels rested upon waking and starts her workday at 5:15 AM. She has recently developed a habit of consuming [employer] potato chips and occasional smoking, although not to the extent of a pack per day. She expresses concern about her longevity if these habits persist. She has experienced weight loss, with a current weight of 101 pounds. 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 Her dietary intake is minimal, often skipping breakfast and consuming a banana, a deluxe hamburger for dinner, and a small meal at home. She occasionally supplements her diet with [employer] protein shakes. She also consumes country ham with tomato.    Her insulin level was recorded as 180 yesterday, and she continues to administer 34 units.         Medications and Allergies   Current Outpatient Medications   Medication Instructions    atorvastatin (LIPITOR) 80 MG tablet TAKE 1 TABLET EVERY NIGHT FOR HIGH CHOLESTEROL    celecoxib (CELEBREX) 100 mg, Oral, Daily    cyclobenzaprine (FLEXERIL) 10 mg, Oral, 3 Times Daily PRN    Diclofenac Sodium  HISTORY/ASSESSMENT/PLAN BY DIAGNOSIS:    Patient is a 73 year old female here for the following reasons:    Essential hypertension  Hypertension for which she is on therapy with candesartan.  Galina tolerates medication well without any side effects or problems such as lightheadedness or dizziness.  No headache, visual change, chest pain, palpitations, shortness of breath, lower extremity edema, stroke or transient ischemic attack-like symptoms.  She states that her blood pressure tends to run high. Compliance with medication is good.   Creatinine (mg/dL)   Date Value   05/11/2022 0.83     Assessment and Plan:  Poorly controlled with the elevated home readings. Will have her increase the candesartan to 8 mg daily.     Mixed hyperlipidemia  Hypercholesterolemia for which she is on therapy with atorvastatin.  No side effects from therapy such as muscle aches, nausea or jaundice.  Recommended to stay on low cholesterol diet.  Compliance with medication is good.  Most recent lipid panel shows   Cholesterol (mg/dL)   Date Value   05/11/2022 173     HDL (mg/dL)   Date Value   05/11/2022 71     Cholesterol/ HDL Ratio (no units)   Date Value   05/11/2022 2.4     Triglycerides (mg/dL)   Date Value   05/11/2022 84     LDL (mg/dL)   Date Value   05/11/2022 85     Liver function:    GOT/AST (Units/L)   Date Value   05/11/2022 26     Assessment/Plan:  Controlled, continue current regimen.         Return in about 6 months (around 11/16/2022) for MWV Subsequent.    ________________________________________________________________    PHYSICAL EXAM:    Visit Vitals  /84 (BP Location: LUE - Left upper extremity, Patient Position: Sitting, Cuff Size: Regular)   Pulse 76   Resp 16   Wt 67 kg (147 lb 9.6 oz)   BMI 23.82 kg/m²   .  Patient is an alert, appropriate, pleasant female in no apparent distress.  HEENT:  Conjunctivae not injected, sclerae white.  Oropharynx moist.  Cardiovascular:  Normal rate, normal rhythm, no murmurs, no  "(VOLTAREN) 4 g, Topical, 2 Times Daily    escitalopram (LEXAPRO) 10 mg, Oral, Daily    glucose blood test strip 1 each, Other, 4 Times Daily Before Meals & Nightly, Use as instructed    glucose monitor monitoring kit 1 each, Not Applicable, 4 Times Daily Before Meals & Nightly    Lancets (accu-chek safe-t pro) lancets 1 each, Other, 4 Times Daily Before Meals & Nightly    Lantus SoloStar 34 Units, Subcutaneous, Daily    lisinopril-hydrochlorothiazide (PRINZIDE,ZESTORETIC) 20-12.5 MG per tablet     metFORMIN ER (GLUCOPHAGE-XR) 500 mg, Oral, Daily With Breakfast, For diabetes    vitamin D (ERGOCALCIFEROL) 50,000 Units, Oral, Weekly     No Known Allergies   I have reviewed the above medications and allergies     Objective:      Vitals Vitals:    04/02/25 1210   BP: 136/72   BP Location: Left arm   Patient Position: Sitting   Cuff Size: Adult   Pulse: 86   Resp: 14   SpO2: 98%   Weight: 45.8 kg (101 lb)   Height: 157.5 cm (62\")     Body mass index is 18.47 kg/m².    Physical Exam  Vitals reviewed.   Constitutional:       General: She is not in acute distress.     Appearance: She is not ill-appearing.   HENT:      Right Ear: Tympanic membrane, ear canal and external ear normal.      Left Ear: Tympanic membrane, ear canal and external ear normal.   Cardiovascular:      Rate and Rhythm: Normal rate and regular rhythm.   Pulmonary:      Effort: Pulmonary effort is normal.      Breath sounds: Normal breath sounds.   Neurological:      Mental Status: She is alert.   Psychiatric:         Mood and Affect: Mood normal.         Behavior: Behavior normal.         Thought Content: Thought content normal.         Judgment: Judgment normal.          Physical Exam       Results  Laboratory Studies  Insulin level was 180.     Assessment/Plan   Issues Addressed/ Plan   Diagnosis Plan   1. Type 2 diabetes mellitus with hyperglycemia, without long-term current use of insulin  Hemoglobin A1c    Comprehensive Metabolic Panel    Microalbumin " gallops, no rubs.   Respiratory:  No respiratory distress, normal breath sounds, no rales, no wheezing.   Extremities:  No edema.  Neurologic:  Grossly normal motor function, no focal deficits noted.   Psychiatric:  Affect full, speech and behavior appropriate       LAB addressed at visit:    Cholesterol (mg/dL)   Date Value   05/11/2022 173     HDL (mg/dL)   Date Value   05/11/2022 71     Cholesterol/ HDL Ratio (no units)   Date Value   05/11/2022 2.4     Triglycerides (mg/dL)   Date Value   05/11/2022 84     LDL (mg/dL)   Date Value   05/11/2022 85             On 5/16/2022, I, Beverly Cazares MA scribed the services personally performed by Emilie Simon MD.     The documentation recorded by the scribe accurately and completely reflects the service(s) I personally performed and the decisions made by me.              / Creatinine Urine Ratio -      2. Mixed hyperlipidemia  Lipid Panel      3. Vitamin D deficiency  Vitamin D 25 hydroxy      4. Fatigue, unspecified type  CBC w AUTO Differential    TSH    T4, free    T3, free    Vitamin B12         Assessment & Plan  1. Fatigue.  Her fatigue could be attributed to inadequate protein intake or insufficient sleep. It is not related to her blood pressure. She is advised to incorporate a daily protein shake into her diet, specifically one containing at least 9 grams of protein. This dietary modification aims to enhance her energy levels. She is also encouraged to maximize her sleep time.    2. Diabetes Mellitus.  Her insulin level was recorded as 180 yesterday, and she continues to administer 34 units. A blood glucose test will be conducted today to ensure optimal control.          There are no Patient Instructions on file for this visit.   Follow up  recommended Return in about 3 months (around 7/2/2025).   - Dragon voice recognition software was utilized to complete this chart.  Every reasonable attempt was made to edit and correct the text, however some incorrect words may remain.   Gustavo Guajardo DO    Patient or patient representative verbalized consent for the use of Ambient Listening during the visit with  Gustavo Guajardo DO for chart documentation. 4/16/2025  10:03 EDT

## 2025-04-16 NOTE — TELEPHONE ENCOUNTER
"Relay     \"LM:Your hemoglobin A1c has increased from 7.2% to 7.4%. Typically we would like for you to stay below 7.5%. Please continue to take medication as directed and watch carbs. Liver and kidney function are stable. Cholesterol is mildly elevated, but has been within target in the past. Continue current dose of atorvastatin. Blood levels are also within normal. No other abnormalities noted. No changes or additions needed to medications or treatment at this time. \"                 "

## 2025-04-16 NOTE — TELEPHONE ENCOUNTER
Caller: Cristel Ojeda    Relationship: Self    Best call back number: [phone]    Requested Prescriptions:   Requested Prescriptions     Pending Prescriptions Disp Refills    Lantus SoloStar 100 UNIT/ML injection pen 6 mL 1     Sig: Inject 34 Units under the skin into the appropriate area as directed Daily for 30 days. Indications: Type 2 Diabetes        Pharmacy where request should be sent: [facility] [id number] [place], [place] - [address] [facility] - [phone] [facility] [phone] FX     Last office visit with prescribing clinician: 4/2/2025   Last telemedicine visit with prescribing clinician: Visit date not found   Next office visit with prescribing clinician: Visit date not found     Additional details provided by patient: NEEDS FILLED     Does the patient have less than a 3 day supply:  [x] Yes  [] No    Would you like a call back once the refill request has been completed: [] Yes [x] No    If the office needs to give you a call back, can they leave a voicemail: [] Yes [x] No    Rosy Waldron   04/16/25 08:29 EDT

## 2025-04-17 DIAGNOSIS — E55.9 VITAMIN D DEFICIENCY: ICD-10-CM

## 2025-04-17 RX ORDER — ERGOCALCIFEROL 1.25 MG/1
50000 CAPSULE, LIQUID FILLED ORAL WEEKLY
Qty: 4 CAPSULE | Refills: 3 | Status: SHIPPED | OUTPATIENT
Start: 2025-04-17

## 2025-05-28 DIAGNOSIS — E11.65 TYPE 2 DIABETES MELLITUS WITH HYPERGLYCEMIA, WITHOUT LONG-TERM CURRENT USE OF INSULIN: ICD-10-CM

## 2025-05-29 RX ORDER — INSULIN GLARGINE 100 [IU]/ML
34 INJECTION, SOLUTION SUBCUTANEOUS DAILY
Qty: 6 ML | Refills: 1 | Status: SHIPPED | OUTPATIENT
Start: 2025-05-29 | End: 2025-06-28

## 2025-05-29 NOTE — TELEPHONE ENCOUNTER
Caller: Cristel Ojeda    Relationship: Self    Best call back number: [phone]        What was the call regarding: PATIENT CALLING TO FOLLOW UP ON THE MEDICATION REQUEST      PATIENT IS ALMOST OUT OF MEDICATION     PLEASE GIVE CALLBACK

## 2025-06-14 DIAGNOSIS — M54.50 CHRONIC BILATERAL LOW BACK PAIN WITHOUT SCIATICA: ICD-10-CM

## 2025-06-14 DIAGNOSIS — M19.90 ARTHRITIS: ICD-10-CM

## 2025-06-14 DIAGNOSIS — G89.29 CHRONIC BILATERAL LOW BACK PAIN WITHOUT SCIATICA: ICD-10-CM

## 2025-06-16 RX ORDER — CELECOXIB 100 MG/1
100 CAPSULE ORAL DAILY
Qty: 90 CAPSULE | Refills: 3 | Status: SHIPPED | OUTPATIENT
Start: 2025-06-16

## 2025-06-16 NOTE — TELEPHONE ENCOUNTER
Rx Refill Note  Requested Prescriptions     Pending Prescriptions Disp Refills    celecoxib (CeleBREX) 100 MG capsule [Pharmacy Med Name: Celecoxib Oral Capsule 100 MG] 90 capsule 3     Sig: TAKE 1 CAPSULE EVERY DAY      Last office visit with prescribing clinician: 4/2/2025   Last telemedicine visit with prescribing clinician: Visit date not found   Next office visit with prescribing clinician: Visit date not found

## 2025-07-16 DIAGNOSIS — E11.65 TYPE 2 DIABETES MELLITUS WITH HYPERGLYCEMIA, WITHOUT LONG-TERM CURRENT USE OF INSULIN: ICD-10-CM

## 2025-07-16 RX ORDER — INSULIN GLARGINE 100 [IU]/ML
34 INJECTION, SOLUTION SUBCUTANEOUS DAILY
Qty: 6 ML | Refills: 1 | Status: SHIPPED | OUTPATIENT
Start: 2025-07-16 | End: 2025-08-15

## 2025-07-16 NOTE — TELEPHONE ENCOUNTER
Caller: Cristel Ojeda    Relationship: Self    Best call back number: [phone]     Requested Prescriptions:   Requested Prescriptions     Pending Prescriptions Disp Refills    Lantus SoloStar 100 UNIT/ML injection pen 6 mL 1     Sig: Inject 34 Units under the skin into the appropriate area as directed Daily for 30 days. Indications: Type 2 Diabetes        Pharmacy where request should be sent: [facility] [id number] - [place], [place] - [address] [facility] - [phone] [facility] [phone] FX     Last office visit with prescribing clinician: 4/2/2025   Last telemedicine visit with prescribing clinician: Visit date not found   Next office visit with prescribing clinician: Visit date not found     Additional details provided by patient: PATIENT STATES THAT SHE HAS ENOUGH MEDICATION TO LAST AT THE END OF THE WEEK     Does the patient have less than a 3 day supply:  [] Yes  [x] No      Almaz Saldana Rep   07/16/25 10:11 EDT

## 2025-07-28 ENCOUNTER — TELEPHONE (OUTPATIENT)
Dept: FAMILY MEDICINE CLINIC | Facility: CLINIC | Age: 75
End: 2025-07-28
Payer: MEDICARE

## 2025-07-28 NOTE — TELEPHONE ENCOUNTER
Refill was sent on 7/16 to [facility] in [place]. Called pharmacy to verify receipt of prescription but was unable to speak to anyone. Left voicemail request conformation.      Patient should have received a 30 day supply with 1 refill when prescription was sent in on 7/16

## 2025-07-28 NOTE — TELEPHONE ENCOUNTER
Caller: Cristel Ojeda    Relationship: Self    Best call back number: [phone]     Requested Prescriptions:   INSULIN     Pharmacy where request should be sent: [facility] [id number] - [place] - [address]  - [phone]  - [phone] FX     Last office visit with prescribing clinician: 4/2/2025   Last telemedicine visit with prescribing clinician: Visit date not found   Next office visit with prescribing clinician: 9/10/2025     Additional details provided by patient: PATIENT IS GOING TO BE OUT OF HER INSULIN BY THE END OF NEXT WEEK BUT CANNOT SEE DR BLAKELY UNTIL SEPTEMBER.     PATIENT WOULD LIKE TO SEE IF THIS CAN BE FILLED UNTIL SHE IS ABLE TO BE SEEN.     PLEASE CALL PATIENT WITH ANY QUESTIONS OR CONCERNS.     Does the patient have less than a 3 day supply:  [] Yes  [x] No    Would you like a call back once the refill request has been completed: [] Yes [x] No    If the office needs to give you a call back, can they leave a voicemail: [] Yes [x] No    Almaz Richard Rep   07/28/25 12:29 EDT

## 2025-08-20 DIAGNOSIS — E11.65 TYPE 2 DIABETES MELLITUS WITH HYPERGLYCEMIA, WITHOUT LONG-TERM CURRENT USE OF INSULIN: ICD-10-CM

## 2025-08-20 RX ORDER — INSULIN GLARGINE 100 [IU]/ML
34 INJECTION, SOLUTION SUBCUTANEOUS DAILY
Qty: 6 ML | Refills: 1 | Status: SHIPPED | OUTPATIENT
Start: 2025-08-20 | End: 2025-09-19